# Patient Record
Sex: MALE | Race: OTHER | HISPANIC OR LATINO | URBAN - METROPOLITAN AREA
[De-identification: names, ages, dates, MRNs, and addresses within clinical notes are randomized per-mention and may not be internally consistent; named-entity substitution may affect disease eponyms.]

---

## 2020-06-26 ENCOUNTER — INPATIENT (INPATIENT)
Facility: HOSPITAL | Age: 75
LOS: 27 days | Discharge: NURSING, REHAB OR LONG-TERM CARE | End: 2020-07-24
Attending: HOSPITALIST | Admitting: HOSPITALIST
Payer: SELF-PAY

## 2020-06-26 VITALS
OXYGEN SATURATION: 100 % | SYSTOLIC BLOOD PRESSURE: 111 MMHG | TEMPERATURE: 98 F | HEART RATE: 101 BPM | RESPIRATION RATE: 18 BRPM | DIASTOLIC BLOOD PRESSURE: 77 MMHG

## 2020-06-26 DIAGNOSIS — F03.90 UNSPECIFIED DEMENTIA WITHOUT BEHAVIORAL DISTURBANCE: ICD-10-CM

## 2020-06-26 DIAGNOSIS — G93.40 ENCEPHALOPATHY, UNSPECIFIED: ICD-10-CM

## 2020-06-26 DIAGNOSIS — F19.10 OTHER PSYCHOACTIVE SUBSTANCE ABUSE, UNCOMPLICATED: ICD-10-CM

## 2020-06-26 DIAGNOSIS — R64 CACHEXIA: ICD-10-CM

## 2020-06-26 DIAGNOSIS — D64.9 ANEMIA, UNSPECIFIED: ICD-10-CM

## 2020-06-26 DIAGNOSIS — Z02.9 ENCOUNTER FOR ADMINISTRATIVE EXAMINATIONS, UNSPECIFIED: ICD-10-CM

## 2020-06-26 LAB
ALBUMIN SERPL ELPH-MCNC: 4.2 G/DL — SIGNIFICANT CHANGE UP (ref 3.3–5)
ALP SERPL-CCNC: 69 U/L — SIGNIFICANT CHANGE UP (ref 40–120)
ALT FLD-CCNC: 11 U/L — SIGNIFICANT CHANGE UP (ref 4–41)
AMPHET UR-MCNC: NEGATIVE — SIGNIFICANT CHANGE UP
ANION GAP SERPL CALC-SCNC: 13 MMO/L — SIGNIFICANT CHANGE UP (ref 7–14)
APAP SERPL-MCNC: < 15 UG/ML — LOW (ref 15–25)
APPEARANCE UR: SIGNIFICANT CHANGE UP
AST SERPL-CCNC: 27 U/L — SIGNIFICANT CHANGE UP (ref 4–40)
BACTERIA # UR AUTO: SIGNIFICANT CHANGE UP
BARBITURATES UR SCN-MCNC: NEGATIVE — SIGNIFICANT CHANGE UP
BASE EXCESS BLDV CALC-SCNC: 3.5 MMOL/L — SIGNIFICANT CHANGE UP
BASOPHILS # BLD AUTO: 0.01 K/UL — SIGNIFICANT CHANGE UP (ref 0–0.2)
BASOPHILS NFR BLD AUTO: 0.1 % — SIGNIFICANT CHANGE UP (ref 0–2)
BENZODIAZ UR-MCNC: NEGATIVE — SIGNIFICANT CHANGE UP
BILIRUB SERPL-MCNC: 0.6 MG/DL — SIGNIFICANT CHANGE UP (ref 0.2–1.2)
BILIRUB UR-MCNC: NEGATIVE — SIGNIFICANT CHANGE UP
BLOOD GAS VENOUS - CREATININE: 1.01 MG/DL — SIGNIFICANT CHANGE UP (ref 0.5–1.3)
BLOOD GAS VENOUS - FIO2: 21 — SIGNIFICANT CHANGE UP
BLOOD UR QL VISUAL: SIGNIFICANT CHANGE UP
BUN SERPL-MCNC: 28 MG/DL — HIGH (ref 7–23)
CALCIUM SERPL-MCNC: 9.6 MG/DL — SIGNIFICANT CHANGE UP (ref 8.4–10.5)
CANNABINOIDS UR-MCNC: POSITIVE — SIGNIFICANT CHANGE UP
CHLORIDE BLDV-SCNC: 102 MMOL/L — SIGNIFICANT CHANGE UP (ref 96–108)
CHLORIDE SERPL-SCNC: 100 MMOL/L — SIGNIFICANT CHANGE UP (ref 98–107)
CO2 SERPL-SCNC: 26 MMOL/L — SIGNIFICANT CHANGE UP (ref 22–31)
COCAINE METAB.OTHER UR-MCNC: NEGATIVE — SIGNIFICANT CHANGE UP
COLOR SPEC: YELLOW — SIGNIFICANT CHANGE UP
CREAT SERPL-MCNC: 1 MG/DL — SIGNIFICANT CHANGE UP (ref 0.5–1.3)
EOSINOPHIL # BLD AUTO: 0.07 K/UL — SIGNIFICANT CHANGE UP (ref 0–0.5)
EOSINOPHIL NFR BLD AUTO: 0.9 % — SIGNIFICANT CHANGE UP (ref 0–6)
ETHANOL BLD-MCNC: < 10 MG/DL — SIGNIFICANT CHANGE UP
GAS PNL BLDV: 140 MMOL/L — SIGNIFICANT CHANGE UP (ref 136–146)
GLUCOSE BLDV-MCNC: 128 MG/DL — HIGH (ref 70–99)
GLUCOSE SERPL-MCNC: 124 MG/DL — HIGH (ref 70–99)
GLUCOSE UR-MCNC: NEGATIVE — SIGNIFICANT CHANGE UP
HCO3 BLDV-SCNC: 25 MMOL/L — SIGNIFICANT CHANGE UP (ref 20–27)
HCT VFR BLD CALC: 37 % — LOW (ref 39–50)
HCT VFR BLDV CALC: 39.5 % — SIGNIFICANT CHANGE UP (ref 39–51)
HGB BLD-MCNC: 12 G/DL — LOW (ref 13–17)
HGB BLDV-MCNC: 12.8 G/DL — LOW (ref 13–17)
HYALINE CASTS # UR AUTO: SIGNIFICANT CHANGE UP
IMM GRANULOCYTES NFR BLD AUTO: 0.3 % — SIGNIFICANT CHANGE UP (ref 0–1.5)
KETONES UR-MCNC: SIGNIFICANT CHANGE UP
LACTATE BLDV-MCNC: 0.9 MMOL/L — SIGNIFICANT CHANGE UP (ref 0.5–2)
LEUKOCYTE ESTERASE UR-ACNC: SIGNIFICANT CHANGE UP
LYMPHOCYTES # BLD AUTO: 0.57 K/UL — LOW (ref 1–3.3)
LYMPHOCYTES # BLD AUTO: 7.6 % — LOW (ref 13–44)
MAGNESIUM SERPL-MCNC: 2.2 MG/DL — SIGNIFICANT CHANGE UP (ref 1.6–2.6)
MCHC RBC-ENTMCNC: 31.1 PG — SIGNIFICANT CHANGE UP (ref 27–34)
MCHC RBC-ENTMCNC: 32.4 % — SIGNIFICANT CHANGE UP (ref 32–36)
MCV RBC AUTO: 95.9 FL — SIGNIFICANT CHANGE UP (ref 80–100)
METHADONE UR-MCNC: NEGATIVE — SIGNIFICANT CHANGE UP
MONOCYTES # BLD AUTO: 0.58 K/UL — SIGNIFICANT CHANGE UP (ref 0–0.9)
MONOCYTES NFR BLD AUTO: 7.8 % — SIGNIFICANT CHANGE UP (ref 2–14)
NEUTROPHILS # BLD AUTO: 6.22 K/UL — SIGNIFICANT CHANGE UP (ref 1.8–7.4)
NEUTROPHILS NFR BLD AUTO: 83.3 % — HIGH (ref 43–77)
NITRITE UR-MCNC: NEGATIVE — SIGNIFICANT CHANGE UP
NRBC # FLD: 0 K/UL — SIGNIFICANT CHANGE UP (ref 0–0)
OPIATES UR-MCNC: NEGATIVE — SIGNIFICANT CHANGE UP
OXYCODONE UR-MCNC: NEGATIVE — SIGNIFICANT CHANGE UP
PCO2 BLDV: 55 MMHG — HIGH (ref 41–51)
PCP UR-MCNC: NEGATIVE — SIGNIFICANT CHANGE UP
PH BLDV: 7.34 PH — SIGNIFICANT CHANGE UP (ref 7.32–7.43)
PH UR: 5.5 — SIGNIFICANT CHANGE UP (ref 5–8)
PHOSPHATE SERPL-MCNC: 4 MG/DL — SIGNIFICANT CHANGE UP (ref 2.5–4.5)
PLATELET # BLD AUTO: 217 K/UL — SIGNIFICANT CHANGE UP (ref 150–400)
PMV BLD: 9 FL — SIGNIFICANT CHANGE UP (ref 7–13)
PO2 BLDV: < 24 MMHG — LOW (ref 35–40)
POTASSIUM BLDV-SCNC: 4.3 MMOL/L — SIGNIFICANT CHANGE UP (ref 3.4–4.5)
POTASSIUM SERPL-MCNC: 4.6 MMOL/L — SIGNIFICANT CHANGE UP (ref 3.5–5.3)
POTASSIUM SERPL-SCNC: 4.6 MMOL/L — SIGNIFICANT CHANGE UP (ref 3.5–5.3)
PROT SERPL-MCNC: 7.3 G/DL — SIGNIFICANT CHANGE UP (ref 6–8.3)
PROT UR-MCNC: 50 — SIGNIFICANT CHANGE UP
RBC # BLD: 3.86 M/UL — LOW (ref 4.2–5.8)
RBC # FLD: 12.9 % — SIGNIFICANT CHANGE UP (ref 10.3–14.5)
RBC CASTS # UR COMP ASSIST: HIGH (ref 0–?)
SALICYLATES SERPL-MCNC: < 5 MG/DL — LOW (ref 15–30)
SAO2 % BLDV: 26.4 % — LOW (ref 60–85)
SARS-COV-2 RNA SPEC QL NAA+PROBE: SIGNIFICANT CHANGE UP
SODIUM SERPL-SCNC: 139 MMOL/L — SIGNIFICANT CHANGE UP (ref 135–145)
SP GR SPEC: 1.02 — SIGNIFICANT CHANGE UP (ref 1–1.04)
SQUAMOUS # UR AUTO: SIGNIFICANT CHANGE UP
TSH SERPL-MCNC: 0.88 UIU/ML — SIGNIFICANT CHANGE UP (ref 0.27–4.2)
UROBILINOGEN FLD QL: SIGNIFICANT CHANGE UP
WBC # BLD: 7.47 K/UL — SIGNIFICANT CHANGE UP (ref 3.8–10.5)
WBC # FLD AUTO: 7.47 K/UL — SIGNIFICANT CHANGE UP (ref 3.8–10.5)
WBC UR QL: HIGH (ref 0–?)

## 2020-06-26 PROCEDURE — 71046 X-RAY EXAM CHEST 2 VIEWS: CPT | Mod: 26

## 2020-06-26 PROCEDURE — 70450 CT HEAD/BRAIN W/O DYE: CPT | Mod: 26

## 2020-06-26 PROCEDURE — 99223 1ST HOSP IP/OBS HIGH 75: CPT | Mod: GC

## 2020-06-26 PROCEDURE — 99285 EMERGENCY DEPT VISIT HI MDM: CPT

## 2020-06-26 RX ORDER — HEPARIN SODIUM 5000 [USP'U]/ML
5000 INJECTION INTRAVENOUS; SUBCUTANEOUS EVERY 12 HOURS
Refills: 0 | Status: DISCONTINUED | OUTPATIENT
Start: 2020-06-26 | End: 2020-07-22

## 2020-06-26 RX ORDER — LANOLIN ALCOHOL/MO/W.PET/CERES
6 CREAM (GRAM) TOPICAL AT BEDTIME
Refills: 0 | Status: DISCONTINUED | OUTPATIENT
Start: 2020-06-26 | End: 2020-07-24

## 2020-06-26 RX ADMIN — HEPARIN SODIUM 5000 UNIT(S): 5000 INJECTION INTRAVENOUS; SUBCUTANEOUS at 20:16

## 2020-06-26 NOTE — H&P ADULT - PROBLEM SELECTOR PLAN 3
Patient appears cachectic. Unable to provide history about recent weight changes. Chest xray unremarkable. CMP unremarkable.  Plan:  -Testing for HIV, Hep B, Hep C

## 2020-06-26 NOTE — H&P ADULT - ASSESSMENT
73 yo M who denies PMH, presenting after being [place] the side of the highway by EMS, found to have dementia and unable to provide a full history.

## 2020-06-26 NOTE — H&P ADULT - NSHPSOCIALHISTORY_GEN_ALL_CORE
Patient only speaks Indian.  Admits to use of marijuana and cigarettes. Denies alcohol use.  Unable to obtain additional history due to cognitive impairment.

## 2020-06-26 NOTE — H&P ADULT - ATTENDING COMMENTS
Patient seen and examined. Agree with above note by resident.    75 y/o Yoruba speaking male found walking on the highway. Patient is oriented to self only. Denies any active complaints. Unable to provide information for family. Exam only remarkable for cachexia. Labs significant for mild anemia. CTH and cxr unremarkable.     # Dementia - without behavior issues in ED. Patient is calm and redirectable. No infectious, metabolic, cardiac, neurologic etiologies apparent at this time. Suspect underlying dementia. Recommend HIV, anemia workup, b12/folate. Nutrition and PT evaluation.     Patient needs SW evaluation for safe discharge plan. No active medical concerns at this time.

## 2020-06-26 NOTE — H&P ADULT - NSHPPHYSICALEXAM_GEN_ALL_CORE
PHYSICAL EXAM:  T(C): 36.8 (20 @ 09:03), Max: 36.8 (20 @ 09:03)  HR: 101 (20 @ 09:03) (101 - 101)  BP: 111/77 (20 @ 09:03) (111/77 - 111/77)  RR: 18 (20 @ 09:03) (18 - 18)  SpO2: 100% (20 @ 09:03) (100% - 100%)  GENERAL: appears cachectic, in no apparent distress, lying in bed  HEAD:  Atraumatic, Normocephalic  EYES: no conjunctival injection, no scleral icterus  CHEST/LUNG: Clear to auscultation bilaterally; No wheeze  HEART: Regular rate and rhythm; No murmurs, rubs, or gallops  ABDOMEN: Soft, Nontender, Nondistended; Bowel sounds present  EXTREMITIES:  2+ Peripheral Pulses, No clubbing, cyanosis, or edema  PSYCH: AAOx1, unsure of baseline. only able to provide name and birthday, cannot give accurate location or year. We cannot confirm that name/ is accurate.  NEUROLOGY: non-focal  SKIN: No rashes or lesions

## 2020-06-26 NOTE — PROVIDER CONTACT NOTE (EICU) - BACKGROUND
ED Attending asked ED SW to meet with 74 year old Urdu speaking male.  He was brought to the [facility] ED because he was founding wandering by police.  MARCIA went to bedside with .  Pt.

## 2020-06-26 NOTE — ED PROVIDER NOTE - NS ED ROS FT
In additional the that documented in the HPI, the additional ROS was obtained:    CONSTITUTIONAL: No fever, no chills  EYES: no change in vision, no blurred vision  HEENT: no trouble swallowing, no trouble speaking, no sore throat  NECK: no pain, no stiffness  CV: no chest pain, no palpitations  RESP: no cough, no shortness of breath  GI: no abdominal pain, no nausea, no vomiting, no diarrhea, no constipation, no BRBPR or melena  : No dysuria, no frequency  NEURO: no headache, no new numbness, no weakness, no dizziness  SKIN: no new rashes  HEME: No easy bruising or bleeding  MSK: No joint pain, no recent trauma  Josef Rich M.D. -Resident

## 2020-06-26 NOTE — H&P ADULT - PROBLEM SELECTOR PLAN 4
Patient admitted to using marijuana and cigarettes. Denies alcohol use and has negative ethanol tox screen.  Plan:  -Monitor for acute mental status changes

## 2020-06-26 NOTE — PROVIDER CONTACT NOTE (EICU) - ASSESSMENT
said his birthday was [birth year].  When asked about next of kin he said it was his mother.  When asked her age he said 200 years old.  The name of the  is Emery Perez. Pt was pleasant and calm.  SW reported the above information to the Attending.

## 2020-06-26 NOTE — ED ADULT NURSE NOTE - CHIEF COMPLAINT QUOTE
Pt states that he was trying to get home to [place] and ran out of money, pt was found walking on the [facility], states that a bus dropped him there.  Pt offers no physical complaints

## 2020-06-26 NOTE — H&P ADULT - PROBLEM SELECTOR PLAN 1
Patient is AAOx1 and unable to give location or additional history.   UDS is only positive for cannabinoids  CT head showed no acute pathological changes  Plan:  -HIV testing  -Attempting to find additional information re: history with social work  -Monitor for any acute mental status changes

## 2020-06-26 NOTE — ED ADULT NURSE NOTE - NSIMPLEMENTINTERV_GEN_ALL_ED
Implemented All Fall Risk Interventions:  Arvilla to call system. Call bell, personal items and telephone within reach. Instruct patient to call for assistance. Room bathroom lighting operational. Non-slip footwear when patient is off stretcher. Physically safe environment: no spills, clutter or unnecessary equipment. Stretcher in lowest position, wheels locked, appropriate side rails in place. Provide visual cue, wrist band, yellow gown, etc. Monitor gait and stability. Monitor for mental status changes and reorient to person, place, and time. Review medications for side effects contributing to fall risk. Reinforce activity limits and safety measures with patient and family.

## 2020-06-26 NOTE — ED ADULT TRIAGE NOTE - CHIEF COMPLAINT QUOTE
Pt states that he was trying to get home to [place] and ran out of money, pt was found walking on the [facility], states that a bus dropped him there.  Pt offers no physical complaints

## 2020-06-26 NOTE — PROVIDER CONTACT NOTE (OTHER) - BACKGROUND
Two Police Officers from [address] [place] [address] [phone] came to [facility] ED today to meet with patient to make a missing person report. Two Police Officers from [address] [place] [address] [phone] came to [facility] ED today to meet with patient to make a missing person report.  Officer Paulo told SW that a report will be sent to the Missing
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
## 2020-06-26 NOTE — ED PROVIDER NOTE - PHYSICAL EXAMINATION
Vital signs reviewed.  CONSTITUTIONAL: NAD, awake; thin elderly male  HEAD: Normocephalic; atraumatic  EYES: EOMI, no nystagmus, no conjunctival injection, no scleral icterus  MOUTH/THROAT:  MMM  NECK: Trachea midline, no JVD  CV: Normal S1, S2; no audible murmurs; extremities WWP  RESP: pectis excavatum. normal work of breathing; CTAB, no stridor  ABD: soft, non-distended; non-tender  : Deferred  MSK/EXT: no edema, normal ROM in all four extremities, no deformities  SKIN: No gross rashes or lesions on exposed skin, no significant trophic changes  NEURO: aaox1 with unclear baseline - does not know year or location or current president, moving all extremities purposefully and spontaneously
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
## 2020-06-26 NOTE — PHARMACOTHERAPY INTERVENTION NOTE - COMMENTS
6/26/2020: Medx Incomplete [id number]  unable to obtain medication list, patient disoriented, no istop record, no insurance fill information, no pharmacy listed, no emergency contact listed

## 2020-06-26 NOTE — PROVIDER CONTACT NOTE (OTHER) - BACKGROUND
ED SW contacted the Missing Persons Line of [facility] [phone].   Krista checked the system and was unable to locate patient.  He suggested a call to [address] [place] [address] [phone].  MARCIA spoke to

## 2020-06-26 NOTE — ED ADULT NURSE NOTE - OBJECTIVE STATEMENT
73 y/o Guatemalan speaking male found on highway, brought in by EMS. States he was trying to get home.  Alert and oriented to person.

## 2020-06-26 NOTE — H&P ADULT - NSHPLABSRESULTS_GEN_ALL_CORE
12.0   7.47  )-----------( 217      ( 2020 10:15 )             37.0       06-    139  |  100  |  28<H>  ----------------------------<  124<H>  4.6   |  26  |  1.00    Ca    9.6      2020 10:15  Phos  4.0       Mg     2.2         TPro  7.3  /  Alb  4.2  /  TBili  0.6  /  DBili  x   /  AST  27  /  ALT  11  /  AlkPhos  69                Urinalysis Basic - ( 2020 10:34 )    Color: YELLOW / Appearance: HAZY / S.025 / pH: 5.5  Gluc: NEGATIVE / Ketone: TRACE  / Bili: NEGATIVE / Urobili: TRACE   Blood: TRACE / Protein: 50 / Nitrite: NEGATIVE   Leuk Esterase: SMALL / RBC: 11-25 / WBC 11-25   Sq Epi: OCC / Non Sq Epi: x / Bacteria: FEW                CAPILLARY BLOOD GLUCOSE

## 2020-06-26 NOTE — ED ADULT NURSE REASSESSMENT NOTE - NS ED NURSE REASSESS COMMENT FT1
Patient brought in by EMS stating he has been trying to get a ride to [place].  Patient alert and oriented to self.  Unsure where he is or what day/year it is. 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  Patient has no other complaints. No injuries noted.  at bedside.  Blood drawn and sent to lab. Patient resting comfortably.
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
## 2020-06-26 NOTE — H&P ADULT - NSHPREVIEWOFSYSTEMS_GEN_ALL_CORE
REVIEW OF SYSTEMS:  EYES/ENT: No visual changes;  No vertigo or throat pain   NECK: No pain or stiffness  RESPIRATORY: No cough, wheezing, hemoptysis; No shortness of breath  CARDIOVASCULAR: No chest pain or palpitations  GASTROINTESTINAL: No abdominal or epigastric pain. No nausea, vomiting, or hematemesis; No diarrhea or constipation. No melena or hematochezia.  GENITOURINARY: No dysuria, frequency or hematuria  NEUROLOGICAL: No numbness or weakness  PSYCH: only able to provide name and birthday, cannot give accurate location or year. Cannot confirm that name/ is accurate.  SKIN: No itching, rashes

## 2020-06-26 NOTE — PROVIDER CONTACT NOTE (OTHER) - ASSESSMENT
Officer Rita at [employer] who also checked their system, however there was no information on patient.  Officer Vosue will send two Officers to ED today to speak with pt. and do a full report.  SW alerted ED Attending spectra [phone] and Hospitalist of the above information.  Pt has been admitted. Officer Rita at [employer] who also checked their system, however there was no information on patient.  Officer Vosue will send two Officers, one will be Hebrew speaking to ED today to speak with pt. and do a full report.  SW alerted ED Attending spectra [id number] and Hospitalist of the above information.  Pt has been admitted.

## 2020-06-27 DIAGNOSIS — B85.2 PEDICULOSIS, UNSPECIFIED: ICD-10-CM

## 2020-06-27 LAB
ANION GAP SERPL CALC-SCNC: 12 MMO/L — SIGNIFICANT CHANGE UP (ref 7–14)
BUN SERPL-MCNC: 21 MG/DL — SIGNIFICANT CHANGE UP (ref 7–23)
CALCIUM SERPL-MCNC: 9.4 MG/DL — SIGNIFICANT CHANGE UP (ref 8.4–10.5)
CHLORIDE SERPL-SCNC: 105 MMOL/L — SIGNIFICANT CHANGE UP (ref 98–107)
CO2 SERPL-SCNC: 25 MMOL/L — SIGNIFICANT CHANGE UP (ref 22–31)
CREAT SERPL-MCNC: 0.68 MG/DL — SIGNIFICANT CHANGE UP (ref 0.5–1.3)
FOLATE SERPL-MCNC: 13.9 NG/ML — SIGNIFICANT CHANGE UP (ref 4.7–20)
GLUCOSE SERPL-MCNC: 85 MG/DL — SIGNIFICANT CHANGE UP (ref 70–99)
HBV SURFACE AB SER-ACNC: REACTIVE — HIGH
HBV SURFACE AG SER-ACNC: NEGATIVE — SIGNIFICANT CHANGE UP
HCT VFR BLD CALC: 35.3 % — LOW (ref 39–50)
HCV AB S/CO SERPL IA: 0.1 S/CO — SIGNIFICANT CHANGE UP (ref 0–0.99)
HCV AB SERPL-IMP: SIGNIFICANT CHANGE UP
HGB BLD-MCNC: 11.8 G/DL — LOW (ref 13–17)
MAGNESIUM SERPL-MCNC: 1.9 MG/DL — SIGNIFICANT CHANGE UP (ref 1.6–2.6)
MCHC RBC-ENTMCNC: 32 PG — SIGNIFICANT CHANGE UP (ref 27–34)
MCHC RBC-ENTMCNC: 33.4 % — SIGNIFICANT CHANGE UP (ref 32–36)
MCV RBC AUTO: 95.7 FL — SIGNIFICANT CHANGE UP (ref 80–100)
NRBC # FLD: 0 K/UL — SIGNIFICANT CHANGE UP (ref 0–0)
PHOSPHATE SERPL-MCNC: 2.6 MG/DL — SIGNIFICANT CHANGE UP (ref 2.5–4.5)
PLATELET # BLD AUTO: 198 K/UL — SIGNIFICANT CHANGE UP (ref 150–400)
PMV BLD: 9.2 FL — SIGNIFICANT CHANGE UP (ref 7–13)
POTASSIUM SERPL-MCNC: 4.2 MMOL/L — SIGNIFICANT CHANGE UP (ref 3.5–5.3)
POTASSIUM SERPL-SCNC: 4.2 MMOL/L — SIGNIFICANT CHANGE UP (ref 3.5–5.3)
RBC # BLD: 3.69 M/UL — LOW (ref 4.2–5.8)
RBC # FLD: 12.9 % — SIGNIFICANT CHANGE UP (ref 10.3–14.5)
SODIUM SERPL-SCNC: 142 MMOL/L — SIGNIFICANT CHANGE UP (ref 135–145)
VIT B12 SERPL-MCNC: 1271 PG/ML — HIGH (ref 200–900)
WBC # BLD: 5.94 K/UL — SIGNIFICANT CHANGE UP (ref 3.8–10.5)
WBC # FLD AUTO: 5.94 K/UL — SIGNIFICANT CHANGE UP (ref 3.8–10.5)

## 2020-06-27 PROCEDURE — 99232 SBSQ HOSP IP/OBS MODERATE 35: CPT | Mod: GC

## 2020-06-27 RX ORDER — PERMETHRIN CREAM 5% W/W 50 MG/G
1 CREAM TOPICAL ONCE
Refills: 0 | Status: COMPLETED | OUTPATIENT
Start: 2020-06-27 | End: 2020-06-27

## 2020-06-27 RX ORDER — PERMETHRIN CREAM 5% W/W 50 MG/G
1 CREAM TOPICAL ONCE
Refills: 0 | Status: DISCONTINUED | OUTPATIENT
Start: 2020-06-27 | End: 2020-06-27

## 2020-06-27 RX ADMIN — PERMETHRIN CREAM 5% W/W 1 APPLICATION(S): 50 CREAM TOPICAL at 16:07

## 2020-06-27 RX ADMIN — HEPARIN SODIUM 5000 UNIT(S): 5000 INJECTION INTRAVENOUS; SUBCUTANEOUS at 05:47

## 2020-06-27 NOTE — PROGRESS NOTE ADULT - PROBLEM SELECTOR PLAN 3
Patient appears cachectic. Unable to provide history about recent weight changes. Chest xray unremarkable. CMP unremarkable.  Plan:  -Testing for HIV, Hep B, Hep C Patient appears cachectic. Unable to provide history about recent weight changes. Chest xray unremarkable. CMP unremarkable.  Plan:  -Hep B negative  -Awaiting Hep C and HIV reusults

## 2020-06-27 NOTE — PHYSICAL THERAPY INITIAL EVALUATION ADULT - PERTINENT HX OF CURRENT PROBLEM, REHAB EVAL
This is a 75 yo M presenting after [place] the side of the [place] by EMS, found to have dementia and unable to provide a full history.

## 2020-06-27 NOTE — PROGRESS NOTE ADULT - PROBLEM SELECTOR PLAN 1
Patient is AAOx1 and unable to give location or additional history.   UDS is only positive for cannabinoids  CT head showed no acute pathological changes  Plan:  -HIV testing  -Attempting to find additional information re: history with social work  -Monitor for any acute mental status changes Patient is AAOx1 and unable to give location or additional history.   UDS is only positive for cannabinoids  CT head showed no acute pathological changes  Plan:  -Awaiting HIV testing results  -Attempting to find additional information re: history with social work. Police report filed.  -Monitor for any acute mental status changes
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
## 2020-06-27 NOTE — DIETITIAN INITIAL EVALUATION ADULT. - PERTINENT LABORATORY DATA
06-27 Na 142 mmol/L Glu 85 mg/dL K+ 4.2 mmol/L Cr 0.68 mg/dL BUN 21 mg/dL Phos 2.6 mg/dL Alb n/a   PAB n/a   Hgb 11.8 g/dL<L> Hct 35.3 %<L> HgA1C n/a    Glucose, Serum: 85 mg/dL

## 2020-06-27 NOTE — PHYSICAL THERAPY INITIAL EVALUATION ADULT - GENERAL OBSERVATIONS, REHAB EVAL
Patient received semi supine in bed , (+) bed alarm ,pt in no apparent distress. Pt is Greenlandic speaking but is able to follow vc's.

## 2020-06-27 NOTE — PROGRESS NOTE ADULT - PROBLEM SELECTOR PLAN 4
Patient admitted to using marijuana and cigarettes. Denies alcohol use and has negative ethanol tox screen.  Plan:  -Monitor for acute mental status changes Nurse believes may have lice. Awaiting call back from derm to parkeraulate

## 2020-06-27 NOTE — PROGRESS NOTE ADULT - SUBJECTIVE AND OBJECTIVE BOX
PROGRESS NOTE:     Patient is a 74y old  Male who presents with a chief complaint of Dementia, unspecified type (2020 06:48)      SUBJECTIVE / OVERNIGHT EVENTS: NAEO. Patient still unable to provide additional info other than his name- Prakash Ramirez. Nurse believes patient may have lice. Denies chest pain ,abd pain, n/v,           MEDICATIONS  (STANDING):  heparin   Injectable 5000 Unit(s) SubCutaneous every 12 hours  melatonin 6 milliGRAM(s) Oral at bedtime    MEDICATIONS  (PRN):      CAPILLARY BLOOD GLUCOSE        I&O's Summary      PHYSICAL EXAM:  Vital Signs Last 24 Hrs  T(C): 36.7 (2020 05:46), Max: 37 (2020 16:03)  T(F): 98 (2020 05:46), Max: 98.6 (2020 16:03)  HR: 69 (2020 05:46) (68 - 85)  BP: 115/75 (2020 05:46) (115/75 - 122/62)  BP(mean): --  RR: 16 (2020 05:46) (15 - 16)  SpO2: 97% (2020 05:46) (95% - 99%)    	GENERAL: appears cachectic, in no apparent distress, lying in bed  	HEAD:  Atraumatic, Normocephalic  	EYES: no conjunctival injection, no scleral icterus  	CHEST/LUNG: Clear to auscultation bilaterally; No wheeze  	HEART: Regular rate and rhythm; No murmurs, rubs, or gallops  	ABDOMEN: Soft, Nontender, Nondistended; Bowel sounds present  	EXTREMITIES:  2+ Peripheral Pulses, No clubbing, cyanosis, or edema  	PSYCH: AAOx1, unsure of baseline. only able to provide name and birthday, cannot give accurate location or year. We cannot confirm that name/ is accurate.  	NEUROLOGY: non-focal  SKIN: No rashes or lesions  LABS:                        11.8   5.94  )-----------( 198      ( 2020 05:49 )             35.3     06-27    142  |  105  |  21  ----------------------------<  85  4.2   |  25  |  0.68    Ca    9.4      2020 05:49  Phos  2.6     06-27  Mg     1.9         TPro  7.3  /  Alb  4.2  /  TBili  0.6  /  DBili  x   /  AST  27  /  ALT  11  /  AlkPhos  69            Urinalysis Basic - ( 2020 10:34 )    Color: YELLOW / Appearance: HAZY / S.025 / pH: 5.5  Gluc: NEGATIVE / Ketone: TRACE  / Bili: NEGATIVE / Urobili: TRACE   Blood: TRACE / Protein: 50 / Nitrite: NEGATIVE   Leuk Esterase: SMALL / RBC: 11-25 / WBC 11-25   Sq Epi: OCC / Non Sq Epi: x / Bacteria: FEW          RADIOLOGY & ADDITIONAL TESTS:  Results Reviewed:   Imaging Personally Reviewed:  Electrocardiogram Personally Reviewed:    COORDINATION OF CARE:  Care Discussed with Consultants/Other Providers [Y/N]:  Prior or Outpatient Records Reviewed [Y/N]:

## 2020-06-27 NOTE — PROGRESS NOTE ADULT - PROBLEM SELECTOR PLAN 5
Dispo: Social work working to find additional information on patient, family, and medical history Patient admitted to using marijuana and cigarettes. Denies alcohol use and has negative ethanol tox screen.  Plan:  -Monitor for acute mental status changes

## 2020-06-27 NOTE — PROGRESS NOTE ADULT - PROBLEM SELECTOR PLAN 2
Mild normocytic anemia (Hgb 12)  Plan:  -Testing B12, Folate Mild normocytic anemia (Hgb 12)  Plan:  -B12 and Folate normal

## 2020-06-27 NOTE — DIETITIAN INITIAL EVALUATION ADULT. - PHYSICAL APPEARANCE
underweight/other (specify)/emaciated nutrition focused physical exam: patient with severe subcutaneous weight loss of triceps, orbital fat pads, buccal region and severe muscle depletion of temporal, shoulder, quadriceps and calf muscles as well as clavicle region.   Skin: No pressure ulcers/DTI noted in flowsheets.  Edema: none noted

## 2020-06-28 LAB
HIV COMBO RESULT: SIGNIFICANT CHANGE UP
HIV1+2 AB SPEC QL: SIGNIFICANT CHANGE UP

## 2020-06-28 PROCEDURE — 99232 SBSQ HOSP IP/OBS MODERATE 35: CPT | Mod: GC

## 2020-06-28 RX ORDER — SODIUM CHLORIDE 9 MG/ML
500 INJECTION, SOLUTION INTRAVENOUS
Refills: 0 | Status: DISCONTINUED | OUTPATIENT
Start: 2020-06-28 | End: 2020-06-29

## 2020-06-28 RX ADMIN — HEPARIN SODIUM 5000 UNIT(S): 5000 INJECTION INTRAVENOUS; SUBCUTANEOUS at 05:26

## 2020-06-28 RX ADMIN — SODIUM CHLORIDE 500 MILLILITER(S): 9 INJECTION, SOLUTION INTRAVENOUS at 23:32

## 2020-06-28 NOTE — PROGRESS NOTE ADULT - PROBLEM SELECTOR PLAN 4
Nurse believes may have lice. Awaiting call back from derm to parkeraulate Will evaluate specimen when placed at bedside  -S/p permetherin  -Derm consult if still finding insects

## 2020-06-28 NOTE — PROGRESS NOTE ADULT - PROBLEM SELECTOR PLAN 1
Patient is AAOx1 and unable to give location or additional history.   UDS is only positive for cannabinoids  CT head showed no acute pathological changes  Plan:  -Awaiting HIV testing results  -Attempting to find additional information re: history with social work. Police report filed.  -Monitor for any acute mental status changes Patient is AAOx1 and unable to give location or additional history.   UDS is only positive for cannabinoids  CT head showed no acute pathological changes  Plan:  -HIV negative  -Attempting to find additional information re: history with social work. Police report filed  -Monitor for any acute mental status changes

## 2020-06-28 NOTE — PROGRESS NOTE ADULT - SUBJECTIVE AND OBJECTIVE BOX
INCOMPLETE NOTE PROGRESS NOTE:     Patient is a 74y old  Male who presents with a chief complaint of Dementia, unspecified type (28 Jun 2020 07:23)      SUBJECTIVE / OVERNIGHT EVENTS:  No acute events  Feels well. No acute complaints. No itching. No pain.    ADDITIONAL REVIEW OF SYSTEMS:  No fevers, chest pain, shortness of breath, abdominal pain, lower extremity swelling or pain, dysuria, or constipation.    MEDICATIONS  (STANDING):  heparin   Injectable 5000 Unit(s) SubCutaneous every 12 hours  melatonin 6 milliGRAM(s) Oral at bedtime    MEDICATIONS  (PRN):      CAPILLARY BLOOD GLUCOSE        I&O's Summary      PHYSICAL EXAM:  Vital Signs Last 24 Hrs  T(C): 36.7 (28 Jun 2020 13:05), Max: 36.9 (27 Jun 2020 22:20)  T(F): 98.1 (28 Jun 2020 13:05), Max: 98.5 (27 Jun 2020 22:20)  HR: 64 (28 Jun 2020 13:05) (59 - 64)  BP: 121/64 (28 Jun 2020 13:05) (101/60 - 121/64)  BP(mean): --  RR: 17 (28 Jun 2020 13:05) (16 - 17)  SpO2: 99% (28 Jun 2020 13:05) (96% - 99%)    CONSTITUTIONAL: NAD, well-developed  CARDIOVASCULAR: Regular rate and rhythm. Normal S1 and S2. No murmurs.  RESPIRATORY: Normal respiratory effort, Lungs CTAB  EXTREMITIES: No JOAQUIN, peripheral pulses intact.  ABDOMEN: Nontender to palpation, normoactive bowel sounds, no rebound/guarding; No hepatosplenomegaly  MUSCLOSKELETAL: no clubbing or cyanosis of digits; no joint swelling or tenderness to palpation  PSYCH: A+O to person, place, and time; affect appropriate  Could not find any insects to examine. Will f/u with nursing and ask to place specimen at bedside.    LABS:                        11.8   5.94  )-----------( 198      ( 27 Jun 2020 05:49 )             35.3     06-27    142  |  105  |  21  ----------------------------<  85  4.2   |  25  |  0.68    Ca    9.4      27 Jun 2020 05:49  Phos  2.6     06-27  Mg     1.9     06-27                  RADIOLOGY & ADDITIONAL TESTS:  Results Reviewed: No labs  Imaging Personally Reviewed:  Electrocardiogram Personally Reviewed:    COORDINATION OF CARE:  Care Discussed with Consultants/Other Providers [Y/N]:  Prior or Outpatient Records Reviewed [Y/N]:

## 2020-06-28 NOTE — PROGRESS NOTE ADULT - PROBLEM SELECTOR PLAN 3
Patient appears cachectic. Unable to provide history about recent weight changes. Chest xray unremarkable. CMP unremarkable.  Plan:  -Hep B negative  -Awaiting Hep C and HIV reusults

## 2020-06-28 NOTE — PROGRESS NOTE ADULT - ASSESSMENT
75 yo M who denies PMH, presenting [place] side of the [place], found to have dementia and unable to provide a full history.

## 2020-06-29 PROCEDURE — 99232 SBSQ HOSP IP/OBS MODERATE 35: CPT | Mod: GC

## 2020-06-29 RX ORDER — PERMETHRIN CREAM 5% W/W 50 MG/G
1 CREAM TOPICAL ONCE
Refills: 0 | Status: COMPLETED | OUTPATIENT
Start: 2020-06-29 | End: 2020-06-29

## 2020-06-29 RX ADMIN — Medication 6 MILLIGRAM(S): at 22:18

## 2020-06-29 RX ADMIN — PERMETHRIN CREAM 5% W/W 1 APPLICATION(S): 50 CREAM TOPICAL at 13:56

## 2020-06-29 RX ADMIN — HEPARIN SODIUM 5000 UNIT(S): 5000 INJECTION INTRAVENOUS; SUBCUTANEOUS at 22:18

## 2020-06-29 NOTE — PROGRESS NOTE ADULT - SUBJECTIVE AND OBJECTIVE BOX
PROGRESS NOTE:     Patient is a 74y old  Male who presents with a chief complaint of Dementia, unspecified type (2020 07:23)      SUBJECTIVE / OVERNIGHT EVENTS:    REVIEW OF SYSTEMS:    CONSTITUTIONAL: No weakness, fevers or chills  EYES/ENT: No visual changes;  No vertigo or throat pain   NECK: No pain or stiffness  RESPIRATORY: No cough, wheezing, hemoptysis; No shortness of breath  CARDIOVASCULAR: No chest pain or palpitations  GASTROINTESTINAL: No abdominal or epigastric pain. No nausea, vomiting, or hematemesis; No diarrhea or constipation. No melena or hematochezia.  GENITOURINARY: No dysuria, frequency or hematuria  NEUROLOGICAL: No numbness or weakness  SKIN: No itching, rashes      MEDICATIONS  (STANDING):  heparin   Injectable 5000 Unit(s) SubCutaneous every 12 hours  lactated ringers. 500 milliLiter(s) (500 mL/Hr) IV Continuous <Continuous>  melatonin 6 milliGRAM(s) Oral at bedtime  permethrin 5% Cream 1 Application(s) Topical once    MEDICATIONS  (PRN):      CAPILLARY BLOOD GLUCOSE        I&O's Summary    2020 07:01  -  2020 07:00  --------------------------------------------------------  IN: 500 mL / OUT: 1400 mL / NET: -900 mL        PHYSICAL EXAM:  Vital Signs Last 24 Hrs  T(C): 36.4 (2020 04:07), Max: 37 (2020 21:38)  T(F): 97.6 (2020 04:07), Max: 98.6 (2020 21:38)  HR: 56 (2020 04:07) (56 - 64)  BP: 114/57 (2020 04:07) (101/51 - 121/64)  BP(mean): --  RR: 17 (2020 04:07) (16 - 17)  SpO2: 95% (2020 04:07) (95% - 99%)    GENERAL: appears cachectic, in no apparent distress, lying in bed  	HEAD:  Atraumatic, Normocephalic  	EYES: no conjunctival injection, no scleral icterus  	CHEST/LUNG: Clear to auscultation bilaterally; No wheeze  	HEART: Regular rate and rhythm; No murmurs, rubs, or gallops  	ABDOMEN: Soft, Nontender, Nondistended; Bowel sounds present  	EXTREMITIES:  2+ Peripheral Pulses, No clubbing, cyanosis, or edema  	PSYCH: AAOx1, unsure of baseline. only able to provide name and birthday, cannot give accurate location or year. We cannot confirm that name/ is accurate.  	NEUROLOGY: non-focal  SKIN: No rashes or lesions    LABS:                      RADIOLOGY & ADDITIONAL TESTS:  Results Reviewed:   Imaging Personally Reviewed:  Electrocardiogram Personally Reviewed:    COORDINATION OF CARE:  Care Discussed with Consultants/Other Providers [Y/N]:  Prior or Outpatient Records Reviewed [Y/N]: PROGRESS NOTE:     Patient is a 74y old  Male who presents with a chief complaint of Dementia, unspecified type (2020 07:23)      SUBJECTIVE / OVERNIGHT EVENTS:  NAEO. Patient feels well and has no complaints.       MEDICATIONS  (STANDING):  heparin   Injectable 5000 Unit(s) SubCutaneous every 12 hours  lactated ringers. 500 milliLiter(s) (500 mL/Hr) IV Continuous <Continuous>  melatonin 6 milliGRAM(s) Oral at bedtime  permethrin 5% Cream 1 Application(s) Topical once    MEDICATIONS  (PRN):      CAPILLARY BLOOD GLUCOSE        I&O's Summary    2020 07:01  -  2020 07:00  --------------------------------------------------------  IN: 500 mL / OUT: 1400 mL / NET: -900 mL        PHYSICAL EXAM:  Vital Signs Last 24 Hrs  T(C): 36.4 (2020 04:07), Max: 37 (2020 21:38)  T(F): 97.6 (2020 04:07), Max: 98.6 (2020 21:38)  HR: 56 (2020 04:07) (56 - 64)  BP: 114/57 (2020 04:07) (101/51 - 121/64)  BP(mean): --  RR: 17 (2020 04:07) (16 - 17)  SpO2: 95% (2020 04:07) (95% - 99%)    GENERAL: appears cachectic, in no apparent distress, lying in bed  	HEAD:  Atraumatic, Normocephalic  	EYES: no conjunctival injection, no scleral icterus  	CHEST/LUNG: Clear to auscultation bilaterally; No wheeze  	HEART: Regular rate and rhythm; No murmurs, rubs, or gallops  	ABDOMEN: Soft, Nontender, Nondistended; Bowel sounds present  	EXTREMITIES:  2+ Peripheral Pulses, No clubbing, cyanosis, or edema  	PSYCH: AAOx1, unsure of baseline. only able to provide name and birthday, cannot give accurate location or year. We cannot confirm that name/ is accurate.  	NEUROLOGY: non-focal  SKIN: No rashes or lesions    LABS:                      RADIOLOGY & ADDITIONAL TESTS:  Results Reviewed:   Imaging Personally Reviewed:  Electrocardiogram Personally Reviewed:    COORDINATION OF CARE:  Care Discussed with Consultants/Other Providers [Y/N]:  Prior or Outpatient Records Reviewed [Y/N]:

## 2020-06-29 NOTE — PROGRESS NOTE ADULT - PROBLEM SELECTOR PLAN 2
Mild normocytic anemia (Hgb 12)  Plan:  -B12 and Folate normal Dispo: Social work working to find additional information on patient, family, and medical history

## 2020-06-29 NOTE — PROGRESS NOTE ADULT - PROBLEM SELECTOR PLAN 4
Will evaluate specimen when placed at bedside  -S/p permetherin  -Derm consult if still finding insects Mild normocytic anemia (Hgb 12)  Plan:  -B12 and Folate normal

## 2020-06-29 NOTE — PROGRESS NOTE ADULT - PROBLEM SELECTOR PLAN 1
Patient is AAOx1 and unable to give location or additional history.   UDS is only positive for cannabinoids  CT head showed no acute pathological changes  Plan:  -HIV negative  -Attempting to find additional information re: history with social work. Police report filed  -Monitor for any acute mental status changes CT head showed no acute pathological changes. UDS is only positive for cannabinoids  Plan:  -HIV negative  -Attempting to find additional information re: history with social work. Police report filed  -Monitor for any acute mental status changes

## 2020-06-29 NOTE — PROGRESS NOTE ADULT - PROBLEM SELECTOR PLAN 3
Patient appears cachectic. Unable to provide history about recent weight changes. Chest xray unremarkable. CMP unremarkable.  Plan:  -Hep B negative  -Awaiting Hep C and HIV reusults Will evaluate specimen when placed at bedside  -S/p permetherin  -Derm consult if still finding insects

## 2020-06-29 NOTE — PROGRESS NOTE ADULT - PROBLEM SELECTOR PLAN 5
Patient admitted to using marijuana and cigarettes. Denies alcohol use and has negative ethanol tox screen.  Plan:  -Monitor for acute mental status changes Patient appears cachectic. Unable to provide history about recent weight changes. Chest xray unremarkable. CMP unremarkable.  Plan:  -Hep B negative  -Hep C negative  -HIV negative

## 2020-06-30 PROCEDURE — 99232 SBSQ HOSP IP/OBS MODERATE 35: CPT | Mod: GC

## 2020-06-30 RX ADMIN — Medication 6 MILLIGRAM(S): at 21:08

## 2020-06-30 RX ADMIN — HEPARIN SODIUM 5000 UNIT(S): 5000 INJECTION INTRAVENOUS; SUBCUTANEOUS at 17:38

## 2020-06-30 NOTE — PROGRESS NOTE ADULT - PROBLEM SELECTOR PLAN 5
Patient appears cachectic. Unable to provide history about recent weight changes. Chest xray unremarkable. CMP unremarkable.  Plan:  -Hep B negative  -Hep C negative  -HIV negative

## 2020-06-30 NOTE — PROGRESS NOTE ADULT - SUBJECTIVE AND OBJECTIVE BOX
PROGRESS NOTE:     Patient is a 74y old  Male who presents with a chief complaint of Dementia, unspecified type (2020 07:38)      SUBJECTIVE / OVERNIGHT EVENTS:    REVIEW OF SYSTEMS:    CONSTITUTIONAL: No weakness, fevers or chills  EYES/ENT: No visual changes;  No vertigo or throat pain   NECK: No pain or stiffness  RESPIRATORY: No cough, wheezing, hemoptysis; No shortness of breath  CARDIOVASCULAR: No chest pain or palpitations  GASTROINTESTINAL: No abdominal or epigastric pain. No nausea, vomiting, or hematemesis; No diarrhea or constipation. No melena or hematochezia.  GENITOURINARY: No dysuria, frequency or hematuria  NEUROLOGICAL: No numbness or weakness  SKIN: No itching, rashes      MEDICATIONS  (STANDING):  heparin   Injectable 5000 Unit(s) SubCutaneous every 12 hours  melatonin 6 milliGRAM(s) Oral at bedtime    MEDICATIONS  (PRN):      CAPILLARY BLOOD GLUCOSE        I&O's Summary    2020 07:01  -  2020 07:00  --------------------------------------------------------  IN: 500 mL / OUT: 1400 mL / NET: -900 mL        PHYSICAL EXAM:  Vital Signs Last 24 Hrs  T(C): 36.9 (2020 05:41), Max: 37.6 (2020 22:06)  T(F): 98.5 (2020 05:41), Max: 99.6 (2020 22:06)  HR: 59 (2020 05:41) (59 - 66)  BP: 110/57 (2020 05:41) (108/58 - 110/57)  BP(mean): --  RR: 16 (2020 05:41) (16 - 17)  SpO2: 97% (2020 05:41) (96% - 98%)      GENERAL: appears cachectic, in no apparent distress, lying in bed  	HEAD:  Atraumatic, Normocephalic  	EYES: no conjunctival injection, no scleral icterus  	CHEST/LUNG: Clear to auscultation bilaterally; No wheeze  	HEART: Regular rate and rhythm; No murmurs, rubs, or gallops  	ABDOMEN: Soft, Nontender, Nondistended; Bowel sounds present  	EXTREMITIES:  2+ Peripheral Pulses, No clubbing, cyanosis, or edema  	PSYCH: AAOx1, unsure of baseline. only able to provide name and birthday, cannot give accurate location or year. We cannot confirm that name/ is accurate.  	NEUROLOGY: non-focal  SKIN: No rashes or lesions      LABS:                      RADIOLOGY & ADDITIONAL TESTS:  Results Reviewed:   Imaging Personally Reviewed:  Electrocardiogram Personally Reviewed:    COORDINATION OF CARE:  Care Discussed with Consultants/Other Providers [Y/N]:  Prior or Outpatient Records Reviewed [Y/N]: PROGRESS NOTE:     Patient is a 74y old  Male who presents with a chief complaint of Dementia, unspecified type (2020 07:38)      SUBJECTIVE / OVERNIGHT EVENTS: NAEO. Patient remains comfortable with no complaints. Permethrin for lice was ordered for the patient.    MEDICATIONS  (STANDING):  heparin   Injectable 5000 Unit(s) SubCutaneous every 12 hours  melatonin 6 milliGRAM(s) Oral at bedtime    MEDICATIONS  (PRN):      CAPILLARY BLOOD GLUCOSE        I&O's Summary    2020 07:01  -  2020 07:00  --------------------------------------------------------  IN: 500 mL / OUT: 1400 mL / NET: -900 mL        PHYSICAL EXAM:  Vital Signs Last 24 Hrs  T(C): 36.9 (2020 05:41), Max: 37.6 (2020 22:06)  T(F): 98.5 (2020 05:41), Max: 99.6 (2020 22:06)  HR: 59 (2020 05:41) (59 - 66)  BP: 110/57 (2020 05:41) (108/58 - 110/57)  BP(mean): --  RR: 16 (2020 05:41) (16 - 17)  SpO2: 97% (2020 05:41) (96% - 98%)      GENERAL: appears cachectic, in no apparent distress, lying in bed  	HEAD:  Atraumatic, Normocephalic  	EYES: no conjunctival injection, no scleral icterus  	CHEST/LUNG: Clear to auscultation bilaterally; No wheeze  	HEART: Regular rate and rhythm; No murmurs, rubs, or gallops  	ABDOMEN: Soft, Nontender, Nondistended; Bowel sounds present  	EXTREMITIES:  2+ Peripheral Pulses, No clubbing, cyanosis, or edema  	PSYCH: AAOx1, unsure of baseline. only able to provide name and birthday, cannot give accurate location or year. We cannot confirm that name/ is accurate.  	NEUROLOGY: non-focal  SKIN: No rashes or lesions      LABS:  No new labs                    RADIOLOGY & ADDITIONAL TESTS:  Results Reviewed:   Imaging Personally Reviewed:  Electrocardiogram Personally Reviewed:    COORDINATION OF CARE:  Care Discussed with Consultants/Other Providers [Y/N]:  Prior or Outpatient Records Reviewed [Y/N]:

## 2020-06-30 NOTE — PROVIDER CONTACT NOTE (OTHER) - ASSESSMENT
Patient is alert and calm and resting comfortably in bed. DBP was 52, but all other VS stable. No acute distress noted.

## 2020-06-30 NOTE — PROGRESS NOTE ADULT - PROBLEM SELECTOR PLAN 3
Will evaluate specimen when placed at bedside  -S/p permetherin  -Derm consult if still finding insects Will evaluate specimen when placed at bedside  -S/p permethrin  -Derm consult if still finding insects

## 2020-06-30 NOTE — PROGRESS NOTE ADULT - PROBLEM SELECTOR PLAN 1
CT head showed no acute pathological changes. UDS is only positive for cannabinoids  Plan:  -HIV negative  -Attempting to find additional information re: history with social work. Police report filed  -Monitor for any acute mental status changes

## 2020-07-01 PROCEDURE — 99232 SBSQ HOSP IP/OBS MODERATE 35: CPT | Mod: GC

## 2020-07-01 RX ADMIN — Medication 6 MILLIGRAM(S): at 22:09

## 2020-07-01 RX ADMIN — HEPARIN SODIUM 5000 UNIT(S): 5000 INJECTION INTRAVENOUS; SUBCUTANEOUS at 05:55

## 2020-07-01 RX ADMIN — HEPARIN SODIUM 5000 UNIT(S): 5000 INJECTION INTRAVENOUS; SUBCUTANEOUS at 17:13

## 2020-07-01 NOTE — PROGRESS NOTE ADULT - PROBLEM SELECTOR PLAN 3
Will evaluate specimen when placed at bedside  -S/p permethrin  -Derm consult if still finding insects

## 2020-07-01 NOTE — PROGRESS NOTE ADULT - SUBJECTIVE AND OBJECTIVE BOX
PROGRESS NOTE:     Patient is a 74y old  Male who presents with a chief complaint of Dementia, unspecified type (2020 06:54)      SUBJECTIVE / OVERNIGHT EVENTS: NAEO. Feels well, no complaints. Eating and drinking well    REVIEW OF SYSTEMS:    CONSTITUTIONAL: No weakness, fevers or chills  EYES/ENT: No visual changes;  No vertigo or throat pain   NECK: No pain or stiffness  RESPIRATORY: No cough, wheezing, hemoptysis; No shortness of breath  CARDIOVASCULAR: No chest pain or palpitations  GASTROINTESTINAL: No abdominal or epigastric pain. No nausea, vomiting, or hematemesis; No diarrhea or constipation. No melena or hematochezia.  GENITOURINARY: No dysuria, frequency or hematuria  NEUROLOGICAL: No numbness or weakness  SKIN: No itching, rashes      MEDICATIONS  (STANDING):  heparin   Injectable 5000 Unit(s) SubCutaneous every 12 hours  melatonin 6 milliGRAM(s) Oral at bedtime    MEDICATIONS  (PRN):      CAPILLARY BLOOD GLUCOSE        I&O's Summary    2020 07:01  -  2020 07:00  --------------------------------------------------------  IN: 0 mL / OUT: 850 mL / NET: -850 mL        PHYSICAL EXAM:  Vital Signs Last 24 Hrs  T(C): 36.8 (2020 05:54), Max: 37.1 (2020 21:07)  T(F): 98.3 (2020 05:54), Max: 98.7 (2020 21:07)  HR: 54 (2020 05:54) (54 - 67)  BP: 138/114 (2020 05:54) (115/53 - 138/114)  BP(mean): --  RR: 18 (2020 05:54) (16 - 18)  SpO2: 98% (2020 05:54) (97% - 100%)      GENERAL: appears cachectic, in no apparent distress, lying in bed  	HEAD:  Atraumatic, Normocephalic  	EYES: no conjunctival injection, no scleral icterus  	CHEST/LUNG: Clear to auscultation bilaterally; No wheeze  	HEART: Regular rate and rhythm; No murmurs, rubs, or gallops  	ABDOMEN: Soft, Nontender, Nondistended; Bowel sounds present  	EXTREMITIES:  2+ Peripheral Pulses, No clubbing, cyanosis, or edema  	PSYCH: unsure of baseline. only able to provide name and birthday, cannot give accurate location or year. We cannot confirm that name/ is accurate.  	NEUROLOGY: non-focal  SKIN: No rashes or lesions    LABS:  No new labs              RADIOLOGY & ADDITIONAL TESTS:  Results Reviewed:   Imaging Personally Reviewed:  Electrocardiogram Personally Reviewed:    COORDINATION OF CARE:  Care Discussed with Consultants/Other Providers [Y/N]:  Prior or Outpatient Records Reviewed [Y/N]: PROGRESS NOTE:     Patient is a 74y old  Male who presents with a chief complaint of Dementia, unspecified type (2020 06:54)      SUBJECTIVE / OVERNIGHT EVENTS: NAEO. Feels well, no complaints. Eating and drinking well.    MEDICATIONS  (STANDING):  heparin   Injectable 5000 Unit(s) SubCutaneous every 12 hours  melatonin 6 milliGRAM(s) Oral at bedtime    MEDICATIONS  (PRN):      CAPILLARY BLOOD GLUCOSE        I&O's Summary    2020 07:01  -  2020 07:00  --------------------------------------------------------  IN: 0 mL / OUT: 850 mL / NET: -850 mL        PHYSICAL EXAM:  Vital Signs Last 24 Hrs  T(C): 36.8 (2020 05:54), Max: 37.1 (2020 21:07)  T(F): 98.3 (2020 05:54), Max: 98.7 (2020 21:07)  HR: 54 (2020 05:54) (54 - 67)  BP: 138/114 (2020 05:54) (115/53 - 138/114)  BP(mean): --  RR: 18 (2020 05:54) (16 - 18)  SpO2: 98% (2020 05:54) (97% - 100%)      GENERAL: appears cachectic, in no apparent distress, lying in bed  	HEAD:  Atraumatic, Normocephalic  	EYES: no conjunctival injection, no scleral icterus  	CHEST/LUNG: Clear to auscultation bilaterally; No wheeze  	HEART: Regular rate and rhythm; No murmurs, rubs, or gallops  	ABDOMEN: Soft, Nontender, Nondistended; Bowel sounds present  	EXTREMITIES:  2+ Peripheral Pulses, No clubbing, cyanosis, or edema  	PSYCH: unsure of baseline. only able to provide name and birthday, cannot give accurate location or year. We cannot confirm that name/ is accurate.  	NEUROLOGY: non-focal  SKIN: No rashes or lesions    LABS:  No new labs              RADIOLOGY & ADDITIONAL TESTS:  Results Reviewed:   Imaging Personally Reviewed:  Electrocardiogram Personally Reviewed:    COORDINATION OF CARE:  Care Discussed with Consultants/Other Providers [Y/N]:  Prior or Outpatient Records Reviewed [Y/N]:

## 2020-07-01 NOTE — PROGRESS NOTE ADULT - ASSESSMENT
75 yo M who denies PMH, presenting [place] side [place] the [place] by [place], found to have dementia and unable to provide a full history. 73 yo M who denies PMH, presenting [place] side [place], found to have dementia and unable to provide a full history awaiting dispo plans by MARCIA.

## 2020-07-02 DIAGNOSIS — Z29.9 ENCOUNTER FOR PROPHYLACTIC MEASURES, UNSPECIFIED: ICD-10-CM

## 2020-07-02 PROCEDURE — 99232 SBSQ HOSP IP/OBS MODERATE 35: CPT | Mod: GC

## 2020-07-02 RX ADMIN — HEPARIN SODIUM 5000 UNIT(S): 5000 INJECTION INTRAVENOUS; SUBCUTANEOUS at 05:16

## 2020-07-02 RX ADMIN — HEPARIN SODIUM 5000 UNIT(S): 5000 INJECTION INTRAVENOUS; SUBCUTANEOUS at 17:08

## 2020-07-02 RX ADMIN — Medication 6 MILLIGRAM(S): at 22:12

## 2020-07-02 NOTE — PROGRESS NOTE ADULT - ASSESSMENT
73 yo M who denies PMH, presenting [place] by [place], found to have dementia and unable to provide a full history awaiting dispo plans by MARCIA.

## 2020-07-02 NOTE — PROGRESS NOTE ADULT - SUBJECTIVE AND OBJECTIVE BOX
PROGRESS NOTE:   Authoted by Dr. Grady Cintron MD  Pager [phone] [facility], [id number] [facility]     Patient is a 74y old  Male who presents with a chief complaint of Dementia, unspecified type (01 Jul 2020 09:13)      SUBJECTIVE / OVERNIGHT EVENTS: The patient was seen and examined at bedside. No acute events overnight. the patient has no chest pain SOB, itching.     REVIEW OF SYSTEMS:    CONSTITUTIONAL: No weakness, fevers or chills  EYES/ENT: No visual changes;  No vertigo or throat pain   NECK: No pain or stiffness  RESPIRATORY: No cough, wheezing, hemoptysis; No shortness of breath  CARDIOVASCULAR: No chest pain or palpitations  GASTROINTESTINAL: No abdominal or epigastric pain. No nausea, vomiting, or hematemesis; No diarrhea or constipation. No melena or hematochezia.  GENITOURINARY: No dysuria, frequency or hematuria  NEUROLOGICAL: No numbness or weakness  SKIN: No itching, rashes    MEDICATIONS  (STANDING):  heparin   Injectable 5000 Unit(s) SubCutaneous every 12 hours  melatonin 6 milliGRAM(s) Oral at bedtime    PHYSICAL EXAM:  Vital Signs Last 24 Hrs  T(C): 36.9 (02 Jul 2020 05:21), Max: 37.1 (01 Jul 2020 22:09)  T(F): 98.4 (02 Jul 2020 05:21), Max: 98.7 (01 Jul 2020 22:09)  HR: 57 (02 Jul 2020 05:21) (57 - 59)  BP: 100/55 (02 Jul 2020 05:21) (100/55 - 109/57)  BP(mean): --  RR: 17 (02 Jul 2020 05:21) (16 - 18)  SpO2: 97% (02 Jul 2020 05:21) (97% - 100%)    CONSTITUTIONAL: NAD, cachectic  RESPIRATORY: Normal respiratory effort; lungs are clear to auscultation bilaterally  CARDIOVASCULAR: Regular rate and rhythm, normal S1 and S2, no murmur/rub/gallop; No lower extremity edema; Peripheral pulses are 2+ bilaterally  ABDOMEN: Nontender to palpation, normoactive bowel sounds, no rebound/guarding; No hepatosplenomegaly  MUSCLOSKELETAL: no clubbing or cyanosis of digits; no joint swelling or tenderness to palpation  PSYCH: A+O to person, place, and time; affect appropriate    COORDINATION OF CARE:  Care Discussed with Consultants/Other Providers [Y/N]: Care discussed with social work  Prior or Outpatient Records Reviewed [Y/N]: PROGRESS NOTE:   Authoted by Dr. Grady Cintron MD  Pager [phone] [facility], [id number] [facility]     Patient is a 74y old  Male who presents with a chief complaint of Dementia, unspecified type (2020 09:13)      SUBJECTIVE / OVERNIGHT EVENTS: The patient was seen and examined at bedside. No acute events overnight. the patient has no chest pain SOB, itching.     REVIEW OF SYSTEMS:    CONSTITUTIONAL: No weakness, fevers or chills  EYES/ENT: No visual changes;  No vertigo or throat pain   NECK: No pain or stiffness  RESPIRATORY: No cough, wheezing, hemoptysis; No shortness of breath  CARDIOVASCULAR: No chest pain or palpitations  GASTROINTESTINAL: No abdominal or epigastric pain. No nausea, vomiting, or hematemesis; No diarrhea or constipation. No melena or hematochezia.  GENITOURINARY: No dysuria, frequency or hematuria  NEUROLOGICAL: No numbness or weakness  SKIN: No itching, rashes    MEDICATIONS  (STANDING):  heparin   Injectable 5000 Unit(s) SubCutaneous every 12 hours  melatonin 6 milliGRAM(s) Oral at bedtime    PHYSICAL EXAM:  Vital Signs Last 24 Hrs  T(C): 36.9 (2020 05:21), Max: 37.1 (2020 22:09)  T(F): 98.4 (2020 05:21), Max: 98.7 (2020 22:09)  HR: 57 (2020 05:21) (57 - 59)  BP: 100/55 (2020 05:21) (100/55 - 109/57)  BP(mean): --  RR: 17 (2020 05:21) (16 - 18)  SpO2: 97% (2020 05:21) (97% - 100%)      GENERAL: appears cachectic, in no apparent distress, lying in bed  	HEAD:  Atraumatic, Normocephalic  	EYES: no conjunctival injection, no scleral icterus  	CHEST/LUNG: Clear to auscultation bilaterally; No wheeze  	HEART: Regular rate and rhythm; No murmurs, rubs, or gallops  	ABDOMEN: Soft, Nontender, Nondistended; Bowel sounds present  	EXTREMITIES:  2+ Peripheral Pulses, No clubbing, cyanosis, or edema  	PSYCH: unsure of baseline. only able to provide name and birthday, cannot give accurate location or year. We cannot confirm that name/ is accurate.  	NEUROLOGY: non-focal  SKIN: No rashes or lesions    COORDINATION OF CARE:  Care Discussed with Consultants/Other Providers [Y/N]: Care discussed with social work  Prior or Outpatient Records Reviewed [Y/N]:

## 2020-07-02 NOTE — PROGRESS NOTE ADULT - PROBLEM SELECTOR PLAN 2
Dispo: Social work working to find additional information on patient, family, and medical history  Transitions of Care Status:  1.  Name of PCP: No PCP  2.  PCP Contacted on Admission: [ ] Y    x] N    3.  PCP contacted at Discharge: [ ] Y    [ ] N    [ ] N/A  4.  Post-Discharge Appointment Date and Location:  5.  Summary of Handoff given to PCP:

## 2020-07-03 PROCEDURE — 99232 SBSQ HOSP IP/OBS MODERATE 35: CPT | Mod: GC

## 2020-07-03 RX ADMIN — Medication 6 MILLIGRAM(S): at 21:09

## 2020-07-03 RX ADMIN — HEPARIN SODIUM 5000 UNIT(S): 5000 INJECTION INTRAVENOUS; SUBCUTANEOUS at 05:21

## 2020-07-03 RX ADMIN — HEPARIN SODIUM 5000 UNIT(S): 5000 INJECTION INTRAVENOUS; SUBCUTANEOUS at 17:27

## 2020-07-03 NOTE — PROGRESS NOTE ADULT - SUBJECTIVE AND OBJECTIVE BOX
PROGRESS NOTE:     Patient is a 74y old  Male who presents with a chief complaint of Dementia, unspecified type (02 Jul 2020 09:01)      SUBJECTIVE / OVERNIGHT EVENTS: No acute events overnight. The patient feels well and is eating well.    REVIEW OF SYSTEMS:    CONSTITUTIONAL: No weakness, fevers or chills  EYES/ENT: No visual changes;  No vertigo or throat pain   NECK: No pain or stiffness  RESPIRATORY: No cough, wheezing, hemoptysis; No shortness of breath  CARDIOVASCULAR: No chest pain or palpitations  GASTROINTESTINAL: No abdominal or epigastric pain. No nausea, vomiting, or hematemesis; No diarrhea or constipation. No melena or hematochezia.  GENITOURINARY: No dysuria, frequency or hematuria  NEUROLOGICAL: No numbness or weakness  SKIN: No itching, rashes      MEDICATIONS  (STANDING):  heparin   Injectable 5000 Unit(s) SubCutaneous every 12 hours  melatonin 6 milliGRAM(s) Oral at bedtime    MEDICATIONS  (PRN):      CAPILLARY BLOOD GLUCOSE        I&O's Summary      PHYSICAL EXAM:  Vital Signs Last 24 Hrs  T(C): 36.7 (03 Jul 2020 05:20), Max: 37 (02 Jul 2020 12:33)  T(F): 98 (03 Jul 2020 05:20), Max: 98.6 (02 Jul 2020 12:33)  HR: 56 (03 Jul 2020 05:20) (56 - 77)  BP: 127/65 (03 Jul 2020 05:20) (122/65 - 129/62)  BP(mean): --  RR: 18 (03 Jul 2020 05:20) (16 - 18)  SpO2: 100% (03 Jul 2020 05:20) (96% - 100%)    CONSTITUTIONAL: NAD, well-developed  RESPIRATORY: Normal respiratory effort; lungs are clear to auscultation bilaterally  CARDIOVASCULAR: Regular rate and rhythm, normal S1 and S2, no murmur/rub/gallop; No lower extremity edema; Peripheral pulses are 2+ bilaterally  ABDOMEN: Nontender to palpation, normoactive bowel sounds, no rebound/guarding; No hepatosplenomegaly  MUSCLOSKELETAL: no clubbing or cyanosis of digits; no joint swelling or tenderness to palpation  PSYCH: A+O to person, place, and time; affect appropriate    LABS:                      RADIOLOGY & ADDITIONAL TESTS:  Results Reviewed:   Imaging Personally Reviewed:  Electrocardiogram Personally Reviewed:    COORDINATION OF CARE:  Care Discussed with Consultants/Other Providers [Y/N]:  Prior or Outpatient Records Reviewed [Y/N]: PROGRESS NOTE:     Patient is a 74y old  Male who presents with a chief complaint of Dementia, unspecified type (2020 09:01)      SUBJECTIVE / OVERNIGHT EVENTS: No acute events overnight. The patient feels well and is eating well.    REVIEW OF SYSTEMS:    CONSTITUTIONAL: No weakness, fevers or chills  EYES/ENT: No visual changes;  No vertigo or throat pain   NECK: No pain or stiffness  RESPIRATORY: No cough, wheezing, hemoptysis; No shortness of breath  CARDIOVASCULAR: No chest pain or palpitations  GASTROINTESTINAL: No abdominal or epigastric pain. No nausea, vomiting, or hematemesis; No diarrhea or constipation. No melena or hematochezia.  GENITOURINARY: No dysuria, frequency or hematuria  NEUROLOGICAL: No numbness or weakness  SKIN: No itching, rashes      MEDICATIONS  (STANDING):  heparin   Injectable 5000 Unit(s) SubCutaneous every 12 hours  melatonin 6 milliGRAM(s) Oral at bedtime    MEDICATIONS  (PRN):      CAPILLARY BLOOD GLUCOSE        I&O's Summary      PHYSICAL EXAM:  Vital Signs Last 24 Hrs  T(C): 36.7 (2020 05:20), Max: 37 (2020 12:33)  T(F): 98 (2020 05:20), Max: 98.6 (2020 12:33)  HR: 56 (2020 05:20) (56 - 77)  BP: 127/65 (2020 05:20) (122/65 - 129/62)  BP(mean): --  RR: 18 (2020 05:20) (16 - 18)  SpO2: 100% (2020 05:20) (96% - 100%)      GENERAL: appears cachectic, in no apparent distress, lying in bed  	HEAD:  Atraumatic, Normocephalic  	EYES: no conjunctival injection, no scleral icterus  	CHEST/LUNG: Clear to auscultation bilaterally; No wheeze  	HEART: Regular rate and rhythm; No murmurs, rubs, or gallops  	ABDOMEN: Soft, Nontender, Nondistended; Bowel sounds present  	EXTREMITIES:  2+ Peripheral Pulses, No clubbing, cyanosis, or edema  	PSYCH: unsure of baseline. only able to provide name and birthday, cannot give accurate location or year. We cannot confirm that name/ is accurate.  	NEUROLOGY: non-focal  SKIN: No rashes or lesions    LABS:                      RADIOLOGY & ADDITIONAL TESTS:  Results Reviewed:   Imaging Personally Reviewed:  Electrocardiogram Personally Reviewed:    COORDINATION OF CARE:  Care Discussed with Consultants/Other Providers [Y/N]:  Prior or Outpatient Records Reviewed [Y/N]:

## 2020-07-04 PROCEDURE — 99232 SBSQ HOSP IP/OBS MODERATE 35: CPT | Mod: GC

## 2020-07-04 RX ADMIN — HEPARIN SODIUM 5000 UNIT(S): 5000 INJECTION INTRAVENOUS; SUBCUTANEOUS at 05:07

## 2020-07-04 RX ADMIN — Medication 6 MILLIGRAM(S): at 21:10

## 2020-07-04 RX ADMIN — HEPARIN SODIUM 5000 UNIT(S): 5000 INJECTION INTRAVENOUS; SUBCUTANEOUS at 17:01

## 2020-07-04 NOTE — PROGRESS NOTE ADULT - SUBJECTIVE AND OBJECTIVE BOX
PROGRESS NOTE:     Patient is a 74y old  Male who presents with a chief complaint of Dementia, unspecified type (03 Jul 2020 08:49)          MEDICATIONS  (STANDING):  heparin   Injectable 5000 Unit(s) SubCutaneous every 12 hours  melatonin 6 milliGRAM(s) Oral at bedtime    MEDICATIONS  (PRN):      CAPILLARY BLOOD GLUCOSE        I&O's Summary      PHYSICAL EXAM:  Vital Signs Last 24 Hrs  T(C): 37.4 (04 Jul 2020 05:36), Max: 37.4 (04 Jul 2020 05:36)  T(F): 99.3 (04 Jul 2020 05:36), Max: 99.3 (04 Jul 2020 05:36)  HR: 61 (04 Jul 2020 05:36) (57 - 67)  BP: 117/62 (04 Jul 2020 05:36) (106/59 - 117/62)  BP(mean): --  RR: 17 (04 Jul 2020 05:36) (17 - 18)  SpO2: 98% (04 Jul 2020 05:36) (97% - 98%)    CONSTITUTIONAL: NAD, well-developed  RESPIRATORY: Normal respiratory effort; lungs are clear to auscultation bilaterally  CARDIOVASCULAR: Regular rate and rhythm, normal S1 and S2, no murmur/rub/gallop; No lower extremity edema; Peripheral pulses are 2+ bilaterally  ABDOMEN: Nontender to palpation, normoactive bowel sounds, no rebound/guarding; No hepatosplenomegaly  MUSCLOSKELETAL: no clubbing or cyanosis of digits; no joint swelling or tenderness to palpation  PSYCH: A+O to person, place, and time; affect appropriate    LABS:                      RADIOLOGY & ADDITIONAL TESTS:  Results Reviewed:   Imaging Personally Reviewed:  Electrocardiogram Personally Reviewed:    COORDINATION OF CARE:  Care Discussed with Consultants/Other Providers [Y/N]:  Prior or Outpatient Records Reviewed [Y/N]: PROGRESS NOTE:     Patient is a 74y old  Male who presents with a chief complaint of Dementia, unspecified type (03 Jul 2020 08:49)    No acute events       MEDICATIONS  (STANDING):  heparin   Injectable 5000 Unit(s) SubCutaneous every 12 hours  melatonin 6 milliGRAM(s) Oral at bedtime    MEDICATIONS  (PRN):      CAPILLARY BLOOD GLUCOSE        I&O's Summary      PHYSICAL EXAM:  Vital Signs Last 24 Hrs  T(C): 37.4 (04 Jul 2020 05:36), Max: 37.4 (04 Jul 2020 05:36)  T(F): 99.3 (04 Jul 2020 05:36), Max: 99.3 (04 Jul 2020 05:36)  HR: 61 (04 Jul 2020 05:36) (57 - 67)  BP: 117/62 (04 Jul 2020 05:36) (106/59 - 117/62)  BP(mean): --  RR: 17 (04 Jul 2020 05:36) (17 - 18)  SpO2: 98% (04 Jul 2020 05:36) (97% - 98%)    CONSTITUTIONAL: NAD, well-developed  RESPIRATORY: Normal respiratory effort; lungs are clear to auscultation bilaterally  CARDIOVASCULAR: Regular rate and rhythm, normal S1 and S2, no murmur/rub/gallop; No lower extremity edema; Peripheral pulses are 2+ bilaterally  ABDOMEN: Nontender to palpation, normoactive bowel sounds, no rebound/guarding; No hepatosplenomegaly  MUSCLOSKELETAL: no clubbing or cyanosis of digits; no joint swelling or tenderness to palpation  PSYCH: A+O to person, place, and time; affect appropriate    LABS:                      RADIOLOGY & ADDITIONAL TESTS:  Results Reviewed:   Imaging Personally Reviewed:  Electrocardiogram Personally Reviewed:    COORDINATION OF CARE:  Care Discussed with Consultants/Other Providers [Y/N]:  Prior or Outpatient Records Reviewed [Y/N]: PROGRESS NOTE:     Patient is a 74y old  Male who presents with a chief complaint of Dementia, unspecified type (03 Jul 2020 08:49)    No acute events overnight. Patient is eating well with no complaints.      MEDICATIONS  (STANDING):  heparin   Injectable 5000 Unit(s) SubCutaneous every 12 hours  melatonin 6 milliGRAM(s) Oral at bedtime    MEDICATIONS  (PRN):      CAPILLARY BLOOD GLUCOSE        I&O's Summary      PHYSICAL EXAM:  Vital Signs Last 24 Hrs  T(C): 37.4 (04 Jul 2020 05:36), Max: 37.4 (04 Jul 2020 05:36)  T(F): 99.3 (04 Jul 2020 05:36), Max: 99.3 (04 Jul 2020 05:36)  HR: 61 (04 Jul 2020 05:36) (57 - 67)  BP: 117/62 (04 Jul 2020 05:36) (106/59 - 117/62)  BP(mean): --  RR: 17 (04 Jul 2020 05:36) (17 - 18)  SpO2: 98% (04 Jul 2020 05:36) (97% - 98%)    CONSTITUTIONAL: NAD, well-developed  RESPIRATORY: Normal respiratory effort; lungs are clear to auscultation bilaterally  CARDIOVASCULAR: Regular rate and rhythm, normal S1 and S2, no murmur/rub/gallop; No lower extremity edema; Peripheral pulses are 2+ bilaterally  ABDOMEN: Nontender to palpation, normoactive bowel sounds, no rebound/guarding; No hepatosplenomegaly  MUSCLOSKELETAL: no clubbing or cyanosis of digits; no joint swelling or tenderness to palpation  PSYCH: A+O to person, place, and time; affect appropriate    LABS:                      RADIOLOGY & ADDITIONAL TESTS:  Results Reviewed:   Imaging Personally Reviewed:  Electrocardiogram Personally Reviewed:    COORDINATION OF CARE:  Care Discussed with Consultants/Other Providers [Y/N]:  Prior or Outpatient Records Reviewed [Y/N]: PROGRESS NOTE:     Patient is a 74y old  Male who presents with a chief complaint of Dementia, unspecified type (03 Jul 2020 08:49)    No acute events overnight. Patient is eating well with no complaints.  ROS otherwise negative    MEDICATIONS  (STANDING):  heparin   Injectable 5000 Unit(s) SubCutaneous every 12 hours  melatonin 6 milliGRAM(s) Oral at bedtime    MEDICATIONS  (PRN):      CAPILLARY BLOOD GLUCOSE        I&O's Summary      PHYSICAL EXAM:  Vital Signs Last 24 Hrs  T(C): 37.4 (04 Jul 2020 05:36), Max: 37.4 (04 Jul 2020 05:36)  T(F): 99.3 (04 Jul 2020 05:36), Max: 99.3 (04 Jul 2020 05:36)  HR: 61 (04 Jul 2020 05:36) (57 - 67)  BP: 117/62 (04 Jul 2020 05:36) (106/59 - 117/62)  BP(mean): --  RR: 17 (04 Jul 2020 05:36) (17 - 18)  SpO2: 98% (04 Jul 2020 05:36) (97% - 98%)    CONSTITUTIONAL: NAD, well-developed  RESPIRATORY: Normal respiratory effort; lungs are clear to auscultation bilaterally  CARDIOVASCULAR: Regular rate and rhythm, normal S1 and S2, no murmur/rub/gallop; No lower extremity edema; Peripheral pulses are 2+ bilaterally  ABDOMEN: Nontender to palpation, normoactive bowel sounds, no rebound/guarding; No hepatosplenomegaly  MUSCLOSKELETAL: no clubbing or cyanosis of digits; no joint swelling or tenderness to palpation  PSYCH: A+O to person, place, and time; affect appropriate    LABS:                      RADIOLOGY & ADDITIONAL TESTS:  Results Reviewed:   Imaging Personally Reviewed:  Electrocardiogram Personally Reviewed:    COORDINATION OF CARE:  Care Discussed with Consultants/Other Providers [Y/N]:  Prior or Outpatient Records Reviewed [Y/N]: PROGRESS NOTE:     Patient is a 74y old  Male who presents with a chief complaint of Dementia, unspecified type (2020 08:49)    No acute events overnight. Patient is eating well with no complaints.  ROS otherwise negative    MEDICATIONS  (STANDING):  heparin   Injectable 5000 Unit(s) SubCutaneous every 12 hours  melatonin 6 milliGRAM(s) Oral at bedtime    MEDICATIONS  (PRN):      CAPILLARY BLOOD GLUCOSE        I&O's Summary      PHYSICAL EXAM:  Vital Signs Last 24 Hrs  T(C): 37.4 (2020 05:36), Max: 37.4 (2020 05:36)  T(F): 99.3 (2020 05:36), Max: 99.3 (2020 05:36)  HR: 61 (2020 05:36) (57 - 67)  BP: 117/62 (2020 05:36) (106/59 - 117/62)  BP(mean): --  RR: 17 (2020 05:36) (17 - 18)  SpO2: 98% (2020 05:36) (97% - 98%)    GENERAL: appears cachectic, in no apparent distress, lying in bed  	HEAD:  Atraumatic, Normocephalic  	EYES: no conjunctival injection, no scleral icterus  	CHEST/LUNG: Clear to auscultation bilaterally; No wheeze  	HEART: Regular rate and rhythm; No murmurs, rubs, or gallops  	ABDOMEN: Soft, Nontender, Nondistended; Bowel sounds present  	EXTREMITIES:  2+ Peripheral Pulses, No clubbing, cyanosis, or edema  	PSYCH: unsure of baseline. only able to provide name and birthday, cannot give accurate location or year. We cannot confirm that name/ is accurate.  	NEUROLOGY: non-focal  SKIN: No rashes or lesions    LABS:                      RADIOLOGY & ADDITIONAL TESTS:  Results Reviewed:   Imaging Personally Reviewed:  Electrocardiogram Personally Reviewed:    COORDINATION OF CARE:  Care Discussed with Consultants/Other Providers [Y/N]:  Prior or Outpatient Records Reviewed [Y/N]:

## 2020-07-05 ENCOUNTER — TRANSCRIPTION ENCOUNTER (OUTPATIENT)
Age: 75
End: 2020-07-05

## 2020-07-05 PROCEDURE — 99232 SBSQ HOSP IP/OBS MODERATE 35: CPT | Mod: GC

## 2020-07-05 RX ADMIN — HEPARIN SODIUM 5000 UNIT(S): 5000 INJECTION INTRAVENOUS; SUBCUTANEOUS at 17:34

## 2020-07-05 RX ADMIN — HEPARIN SODIUM 5000 UNIT(S): 5000 INJECTION INTRAVENOUS; SUBCUTANEOUS at 05:02

## 2020-07-05 RX ADMIN — Medication 6 MILLIGRAM(S): at 21:05

## 2020-07-05 NOTE — DISCHARGE NOTE PROVIDER - PROVIDER TOKENS
FREE:[LAST:[[facility] [place]],PHONE:[phone]],FAX:[(   [phone]    -],ADDRESS:[[address] [place] [address]],FOLLOWUP:[1 month]]

## 2020-07-05 NOTE — DISCHARGE NOTE PROVIDER - NSFOLLOWUPCLINICS_GEN_ALL_ED_FT
[facility] [place]  Internal Medicine  [address] [place] [address]  Phone: [phone]  Fax: [phone]  Follow Up Time: 1 month

## 2020-07-05 NOTE — PROGRESS NOTE ADULT - SUBJECTIVE AND OBJECTIVE BOX
PROGRESS NOTE:     Patient is a 74y old  Male who presents with a chief complaint of Dementia, unspecified type (04 Jul 2020 06:41)      SUBJECTIVE / OVERNIGHT EVENTS:  No acute events overnight. Patient is eating well and has no complaints.        MEDICATIONS  (STANDING):  heparin   Injectable 5000 Unit(s) SubCutaneous every 12 hours  melatonin 6 milliGRAM(s) Oral at bedtime    MEDICATIONS  (PRN):      CAPILLARY BLOOD GLUCOSE        I&O's Summary      PHYSICAL EXAM:  Vital Signs Last 24 Hrs  T(C): 36.6 (05 Jul 2020 05:41), Max: 37.2 (04 Jul 2020 21:10)  T(F): 97.9 (05 Jul 2020 05:41), Max: 98.9 (04 Jul 2020 21:10)  HR: 84 (05 Jul 2020 05:41) (63 - 84)  BP: 128/84 (05 Jul 2020 05:41) (122/59 - 129/61)  BP(mean): --  RR: 17 (05 Jul 2020 05:41) (16 - 17)  SpO2: 98% (05 Jul 2020 05:41) (96% - 98%)    CONSTITUTIONAL: NAD, well-developed  RESPIRATORY: Normal respiratory effort; lungs are clear to auscultation bilaterally  CARDIOVASCULAR: Regular rate and rhythm, normal S1 and S2, no murmur/rub/gallop; No lower extremity edema; Peripheral pulses are 2+ bilaterally  ABDOMEN: Nontender to palpation, normoactive bowel sounds, no rebound/guarding; No hepatosplenomegaly  MUSCLOSKELETAL: no clubbing or cyanosis of digits; no joint swelling or tenderness to palpation  PSYCH: A+O to person, place, and time; affect appropriate    LABS:  No new labs                    RADIOLOGY & ADDITIONAL TESTS:  Results Reviewed:   Imaging Personally Reviewed:  Electrocardiogram Personally Reviewed:    COORDINATION OF CARE:  Care Discussed with Consultants/Other Providers [Y/N]:  Prior or Outpatient Records Reviewed [Y/N]:  \ PROGRESS NOTE:     Patient is a 74y old  Male who presents with a chief complaint of Dementia, unspecified type (2020 06:41)      SUBJECTIVE / OVERNIGHT EVENTS:  No acute events overnight. Patient is eating well and has no complaints. Responding to internal stimuli this morning.        MEDICATIONS  (STANDING):  heparin   Injectable 5000 Unit(s) SubCutaneous every 12 hours  melatonin 6 milliGRAM(s) Oral at bedtime    MEDICATIONS  (PRN):      CAPILLARY BLOOD GLUCOSE        I&O's Summary      PHYSICAL EXAM:  Vital Signs Last 24 Hrs  T(C): 36.6 (2020 05:41), Max: 37.2 (2020 21:10)  T(F): 97.9 (2020 05:41), Max: 98.9 (2020 21:10)  HR: 84 (2020 05:41) (63 - 84)  BP: 128/84 (2020 05:41) (122/59 - 129/61)  BP(mean): --  RR: 17 (2020 05:41) (16 - 17)  SpO2: 98% (2020 05:41) (96% - 98%)      GENERAL: appears cachectic, in no apparent distress, lying in bed  	HEAD:  Atraumatic, Normocephalic  	EYES: no conjunctival injection, no scleral icterus  	CHEST/LUNG: Clear to auscultation bilaterally; No wheeze  	HEART: Regular rate and rhythm; No murmurs, rubs, or gallops  	ABDOMEN: Soft, Nontender, Nondistended; Bowel sounds present  	EXTREMITIES:  2+ Peripheral Pulses, No clubbing, cyanosis, or edema  	PSYCH: unsure of baseline. only able to provide name and birthday, cannot give accurate location or year. We cannot confirm that name/ is accurate.  	NEUROLOGY: non-focal  SKIN: No rashes or lesions  LABS:  No new labs                    RADIOLOGY & ADDITIONAL TESTS:  Results Reviewed:   Imaging Personally Reviewed:  Electrocardiogram Personally Reviewed:    COORDINATION OF CARE:  Care Discussed with Consultants/Other Providers [Y/N]:  Prior or Outpatient Records Reviewed [Y/N]:  \

## 2020-07-05 NOTE — DISCHARGE NOTE PROVIDER - HOSPITAL COURSE
73yo? man found walking [place] the side of the highway and was brought in by EMS. He was unable to provide a coherent history besides his name and birthday. He denied headache, chest pain, shortness of breath, nausea, vomiting, pain, swelling and had no complaints. He was evaluated by Social Work and found... 73yo? man found walking [place] the side of the highway and was brought in by EMS. He was unable to provide a coherent history besides his name and birthday. He denied headache, chest pain, shortness of breath, nausea, vomiting, pain, swelling and had no complaints. He was found to have body lice on two occasions and tx with permethrin x2. His vitals and once weekly labs have been stable. 73yo? man found walking [place] the side of the highway and was brought in by EMS. He was unable to provide a coherent history besides his name and birthday. He denied headache, chest pain, shortness of breath, nausea, vomiting, pain, swelling and had no complaints. He was found to have body lice on two occasions and tx with permethrin x3. His vitals and once weekly labs have been stable. His lipid panel showed no abnormalities. His vitamin D was slightly low for which he was started on multivitamin and 75yo? man found walking on the side of the highway and was brought in by EMS. He was unable to provide a coherent history besides his name and birthday. He denied headache, chest pain, shortness of breath, nausea, vomiting, pain, swelling and had no complaints. He was found to have body lice on two occasions and tx with permethrin x3. His vitals and once weekly labs have been stable. His lipid panel showed no abnormalities. His vitamin D was slightly low for which he was started on multivitamin and encouraged to have better nutrition overall. His toxicology was positive for cannabinoids. 73yo man found walking [place] the side of the highway and was brought in by EMS. He was unable to provide a coherent history besides his name and birthday. He denied headache, chest pain, shortness of breath, nausea, vomiting, pain, swelling and had no complaints. He was found to have body lice on two occasions and tx with permethrin x3. His vitals and once weekly labs have been stable. His lipid panel showed no abnormalities. His vitamin D was slightly low for which he was started on multivitamin and encouraged to have better nutrition overall. His toxicology was positive for cannabinoids. Pt is stable and ready to be discharged.

## 2020-07-05 NOTE — DISCHARGE NOTE PROVIDER - NSDCCPCAREPLAN_GEN_ALL_CORE_FT
PRINCIPAL DISCHARGE DIAGNOSIS  Diagnosis: Encephalopathy  Assessment and Plan of Treatment: You were found to be wondering in the highway in a state of delirium where you were unable to identify any information about you. Your toxicology was found to be positive for cannabinoids which might be a cause for your altered mental status. No other causes were identified.      SECONDARY DISCHARGE DIAGNOSES  Diagnosis: Pediculosis corporis  Assessment and Plan of Treatment: Upon admission you were found to have lice. You were treated with permethrin three times to help clear the infestation.

## 2020-07-05 NOTE — PROGRESS NOTE ADULT - ASSESSMENT
75 yo M who denies PMH, presenting after being found [place] the side of the highway by EMS, found to have dementia and unable to provide a full history awaiting dispo plans by MARCIA.

## 2020-07-05 NOTE — DISCHARGE NOTE PROVIDER - NSDCMRMEDTOKEN_GEN_ALL_CORE_FT
melatonin 3 mg oral tablet: 2 tab(s) orally once a day (at bedtime)  Multiple Vitamins oral tablet: 1 tab(s) orally once a day

## 2020-07-05 NOTE — DISCHARGE NOTE PROVIDER - CARE PROVIDER_API CALL
[facility] [place],   [address] [place] [address]  Phone: [phone]  Fax: (   )    -  Follow Up Time: 1 month

## 2020-07-06 PROCEDURE — 99232 SBSQ HOSP IP/OBS MODERATE 35: CPT

## 2020-07-06 RX ADMIN — HEPARIN SODIUM 5000 UNIT(S): 5000 INJECTION INTRAVENOUS; SUBCUTANEOUS at 05:44

## 2020-07-06 RX ADMIN — Medication 6 MILLIGRAM(S): at 21:18

## 2020-07-06 RX ADMIN — HEPARIN SODIUM 5000 UNIT(S): 5000 INJECTION INTRAVENOUS; SUBCUTANEOUS at 17:29

## 2020-07-06 NOTE — PROGRESS NOTE ADULT - SUBJECTIVE AND OBJECTIVE BOX
Georgiana Brand PGY1    Patient is a 74y old  Male who presents with a chief complaint of Dementia, unspecified type (05 Jul 2020 14:11)      SUBJECTIVE / OVERNIGHT EVENTS:  ON: nothing  AM: responds appropriately to questions, calm. Not in pain. Not short of breath.     MEDICATIONS  (STANDING):  heparin   Injectable 5000 Unit(s) SubCutaneous every 12 hours  melatonin 6 milliGRAM(s) Oral at bedtime    MEDICATIONS  (PRN):      CAPILLARY BLOOD GLUCOSE        I&O's Summary    05 Jul 2020 07:01  -  06 Jul 2020 07:00  --------------------------------------------------------  IN: 0 mL / OUT: 300 mL / NET: -300 mL        Vital Signs Last 24 Hrs  T(C): 37 (06 Jul 2020 12:10), Max: 37 (06 Jul 2020 12:10)  T(F): 98.6 (06 Jul 2020 12:10), Max: 98.6 (06 Jul 2020 12:10)  HR: 55 (06 Jul 2020 12:10) (55 - 62)  BP: 123/50 (06 Jul 2020 12:10) (100/50 - 123/50)  BP(mean): --  RR: 17 (06 Jul 2020 12:10) (16 - 17)  SpO2: 109% (06 Jul 2020 12:10) (97% - 109%)    PHYSICAL EXAM:  GENERAL: NAD, well-developed, well-nourished  HEAD: Atraumatic, Normocephalic  EYES: EOMI, PERRLA, conjunctiva and sclera clear  NECK: Supple, No JVD  CHEST/LUNG: Clear to auscultation bilaterally; No wheezes or crackles  HEART: Normal S1/S2; Regular rate and rhythm; No murmurs, rubs, or gallops  ABDOMEN: Soft, Nontender, Nondistended; Bowel sounds present  EXTREMITIES: 2+ Peripheral Pulses; No clubbing, cyanosis, or edema  PSYCH: A&Ox3  NEUROLOGY: no focal neurologic deficit  SKIN: No rashes or lesions    LABS:                     RADIOLOGY & ADDITIONAL TESTS:    Imaging Personally Reviewed:    Consultant(s) Notes Reviewed:      Care Discussed with Consultants/Other Providers: Georgiana Brand PGY1    Patient is a 74y old  Male who presents with a chief complaint of Dementia, unspecified type (05 Jul 2020 14:11)    SUBJECTIVE / OVERNIGHT EVENTS:  ON: nothing  AM: responds appropriately to questions, calm. Not in pain. Not short of breath.     MEDICATIONS  (STANDING):  heparin   Injectable 5000 Unit(s) SubCutaneous every 12 hours  melatonin 6 milliGRAM(s) Oral at bedtime    MEDICATIONS  (PRN):      CAPILLARY BLOOD GLUCOSE        I&O's Summary    05 Jul 2020 07:01  -  06 Jul 2020 07:00  --------------------------------------------------------  IN: 0 mL / OUT: 300 mL / NET: -300 mL        Vital Signs Last 24 Hrs  T(C): 37 (06 Jul 2020 12:10), Max: 37 (06 Jul 2020 12:10)  T(F): 98.6 (06 Jul 2020 12:10), Max: 98.6 (06 Jul 2020 12:10)  HR: 55 (06 Jul 2020 12:10) (55 - 62)  BP: 123/50 (06 Jul 2020 12:10) (100/50 - 123/50)  BP(mean): --  RR: 17 (06 Jul 2020 12:10) (16 - 17)  SpO2: 109% (06 Jul 2020 12:10) (97% - 109%)    PHYSICAL EXAM:  GENERAL: NAD, well-developed, well-nourished  HEAD: Atraumatic, Normocephalic  EYES: EOMI, PERRLA, conjunctiva and sclera clear  NECK: Supple, No JVD  CHEST/LUNG: Clear to auscultation bilaterally; No wheezes or crackles  HEART: Normal S1/S2; Regular rate and rhythm; No murmurs, rubs, or gallops  ABDOMEN: Soft, Nontender, Nondistended; Bowel sounds present  EXTREMITIES: 2+ Peripheral Pulses; No clubbing, cyanosis, or edema  PSYCH: A&Ox3  NEUROLOGY: no focal neurologic deficit  SKIN: No rashes or lesions    LABS:                     RADIOLOGY & ADDITIONAL TESTS:    Imaging Personally Reviewed:    Consultant(s) Notes Reviewed:      Care Discussed with Consultants/Other Providers:

## 2020-07-06 NOTE — PROGRESS NOTE ADULT - PROBLEM SELECTOR PLAN 1
CT head showed no acute pathological changes. UDS is only positive for cannabinoids  Plan:  -HIV negative  -Attempting to find additional information re: history with social work. Police report filed  -Monitor for any acute mental status changes  - labs 7/7 (once a week labs)

## 2020-07-06 NOTE — PROGRESS NOTE ADULT - PROBLEM SELECTOR PLAN 4
Mild normocytic anemia (Hgb 12)  Plan:  -B12 and Folate normal Patient appears cachectic. Unable to provide history about recent weight changes. Chest xray unremarkable. CMP unremarkable.  Plan:  -Hep B negative  -Hep C negative  -HIV negative

## 2020-07-06 NOTE — PROGRESS NOTE ADULT - PROBLEM SELECTOR PLAN 5
Patient appears cachectic. Unable to provide history about recent weight changes. Chest xray unremarkable. CMP unremarkable.  Plan:  -Hep B negative  -Hep C negative  -HIV negative Patient admitted to using marijuana and cigarettes. Denies alcohol use and has negative ethanol tox screen.  Plan:  -Monitor for acute mental status changes

## 2020-07-06 NOTE — PROGRESS NOTE ADULT - PROBLEM SELECTOR PLAN 6
Patient admitted to using marijuana and cigarettes. Denies alcohol use and has negative ethanol tox screen.  Plan:  -Monitor for acute mental status changes DVT prophylaxis: subq heparin

## 2020-07-06 NOTE — PROGRESS NOTE ADULT - PROBLEM SELECTOR PLAN 3
Will evaluate specimen when placed at bedside  -S/p permethrin  -Derm consult if still finding insects Mild normocytic anemia (Hgb 12)  Plan:  -B12 and Folate normal

## 2020-07-06 NOTE — PROGRESS NOTE ADULT - PROBLEM SELECTOR PLAN 7
DVT prophylaxis: subq heparin Dispo: Social work working to find additional information on patient, family, and medical history  Transitions of Care Status:  1.  Name of PCP: No PCP  2.  PCP Contacted on Admission: [ ] Y    x] N    3.  PCP contacted at Discharge: [ ] Y    [ ] N    [ ] N/A  4.  Post-Discharge Appointment Date and Location:  5.  Summary of Handoff given to PCP:

## 2020-07-06 NOTE — PROGRESS NOTE ADULT - PROBLEM SELECTOR PLAN 2
Dispo: Social work working to find additional information on patient, family, and medical history  Transitions of Care Status:  1.  Name of PCP: No PCP  2.  PCP Contacted on Admission: [ ] Y    x] N    3.  PCP contacted at Discharge: [ ] Y    [ ] N    [ ] N/A  4.  Post-Discharge Appointment Date and Location:  5.  Summary of Handoff given to PCP: Will evaluate specimen when placed at bedside  -S/p permethrin  -Derm consult if new insects found

## 2020-07-07 LAB
ANION GAP SERPL CALC-SCNC: 11 MMO/L — SIGNIFICANT CHANGE UP (ref 7–14)
BUN SERPL-MCNC: 34 MG/DL — HIGH (ref 7–23)
CALCIUM SERPL-MCNC: 9.7 MG/DL — SIGNIFICANT CHANGE UP (ref 8.4–10.5)
CHLORIDE SERPL-SCNC: 102 MMOL/L — SIGNIFICANT CHANGE UP (ref 98–107)
CO2 SERPL-SCNC: 26 MMOL/L — SIGNIFICANT CHANGE UP (ref 22–31)
CREAT SERPL-MCNC: 0.83 MG/DL — SIGNIFICANT CHANGE UP (ref 0.5–1.3)
GLUCOSE SERPL-MCNC: 83 MG/DL — SIGNIFICANT CHANGE UP (ref 70–99)
HCT VFR BLD CALC: 37.6 % — LOW (ref 39–50)
HGB BLD-MCNC: 12 G/DL — LOW (ref 13–17)
MCHC RBC-ENTMCNC: 30.9 PG — SIGNIFICANT CHANGE UP (ref 27–34)
MCHC RBC-ENTMCNC: 31.9 % — LOW (ref 32–36)
MCV RBC AUTO: 96.9 FL — SIGNIFICANT CHANGE UP (ref 80–100)
NRBC # FLD: 0 K/UL — SIGNIFICANT CHANGE UP (ref 0–0)
PLATELET # BLD AUTO: 241 K/UL — SIGNIFICANT CHANGE UP (ref 150–400)
PMV BLD: 10.2 FL — SIGNIFICANT CHANGE UP (ref 7–13)
POTASSIUM SERPL-MCNC: 4.6 MMOL/L — SIGNIFICANT CHANGE UP (ref 3.5–5.3)
POTASSIUM SERPL-SCNC: 4.6 MMOL/L — SIGNIFICANT CHANGE UP (ref 3.5–5.3)
RBC # BLD: 3.88 M/UL — LOW (ref 4.2–5.8)
RBC # FLD: 13.1 % — SIGNIFICANT CHANGE UP (ref 10.3–14.5)
SODIUM SERPL-SCNC: 139 MMOL/L — SIGNIFICANT CHANGE UP (ref 135–145)
WBC # BLD: 7.13 K/UL — SIGNIFICANT CHANGE UP (ref 3.8–10.5)
WBC # FLD AUTO: 7.13 K/UL — SIGNIFICANT CHANGE UP (ref 3.8–10.5)

## 2020-07-07 PROCEDURE — 99232 SBSQ HOSP IP/OBS MODERATE 35: CPT

## 2020-07-07 RX ADMIN — Medication 6 MILLIGRAM(S): at 21:01

## 2020-07-07 RX ADMIN — HEPARIN SODIUM 5000 UNIT(S): 5000 INJECTION INTRAVENOUS; SUBCUTANEOUS at 17:03

## 2020-07-07 RX ADMIN — HEPARIN SODIUM 5000 UNIT(S): 5000 INJECTION INTRAVENOUS; SUBCUTANEOUS at 05:00

## 2020-07-07 NOTE — PROGRESS NOTE ADULT - SUBJECTIVE AND OBJECTIVE BOX
Georgiana Brand PGY1    Patient is a 74y old  Male who presents with a chief complaint of Dementia, unspecified type (06 Jul 2020 14:02)      SUBJECTIVE / OVERNIGHT EVENTS:    MEDICATIONS  (STANDING):  heparin   Injectable 5000 Unit(s) SubCutaneous every 12 hours  melatonin 6 milliGRAM(s) Oral at bedtime    MEDICATIONS  (PRN):      CAPILLARY BLOOD GLUCOSE        I&O's Summary      Vital Signs Last 24 Hrs  T(C): 36.5 (07 Jul 2020 05:00), Max: 37 (06 Jul 2020 12:10)  T(F): 97.7 (07 Jul 2020 05:00), Max: 98.6 (06 Jul 2020 12:10)  HR: 57 (07 Jul 2020 05:00) (55 - 60)  BP: 112/50 (07 Jul 2020 05:00) (112/50 - 125/53)  BP(mean): --  RR: 16 (07 Jul 2020 05:00) (16 - 18)  SpO2: 97% (07 Jul 2020 05:00) (97% - 109%)    PHYSICAL EXAM:  GENERAL: NAD, well-developed, well-nourished  HEAD: Atraumatic, Normocephalic  EYES: EOMI, PERRLA, conjunctiva and sclera clear  NECK: Supple, No JVD  CHEST/LUNG: Clear to auscultation bilaterally; No wheezes or crackles  HEART: Normal S1/S2; Regular rate and rhythm; No murmurs, rubs, or gallops  ABDOMEN: Soft, Nontender, Nondistended; Bowel sounds present  EXTREMITIES: 2+ Peripheral Pulses; No clubbing, cyanosis, or edema  PSYCH: A&Ox3  NEUROLOGY: no focal neurologic deficit  SKIN: No rashes or lesions    LABS:                     RADIOLOGY & ADDITIONAL TESTS:    Imaging Personally Reviewed:    Consultant(s) Notes Reviewed:      Care Discussed with Consultants/Other Providers: Georgiana Brand PGY1    Patient is a 74y old  Male who presents with a chief complaint of Dementia, unspecified type (07 Jul 2020 07:35)      SUBJECTIVE / OVERNIGHT EVENTS:  overnight: nothing  am: states he is not in pain, feels well, oriented and alert.   MEDICATIONS  (STANDING):  heparin   Injectable 5000 Unit(s) SubCutaneous every 12 hours  melatonin 6 milliGRAM(s) Oral at bedtime    MEDICATIONS  (PRN):      CAPILLARY BLOOD GLUCOSE        I&O's Summary      Vital Signs Last 24 Hrs  T(C): 36.5 (07 Jul 2020 05:00), Max: 37 (06 Jul 2020 12:10)  T(F): 97.7 (07 Jul 2020 05:00), Max: 98.6 (06 Jul 2020 12:10)  HR: 57 (07 Jul 2020 05:00) (55 - 60)  BP: 112/50 (07 Jul 2020 05:00) (112/50 - 125/53)  BP(mean): --  RR: 16 (07 Jul 2020 05:00) (16 - 18)  SpO2: 97% (07 Jul 2020 05:00) (97% - 109%)    PHYSICAL EXAM:  GENERAL: NAD, well-developed, well-nourished  HEAD: Atraumatic, Normocephalic  EYES: EOMI, PERRLA, conjunctiva and sclera clear  NECK: Supple, No JVD  CHEST/LUNG: Clear to auscultation bilaterally; No wheezes or crackles  HEART: Normal S1/S2; Regular rate and rhythm; No murmurs, rubs, or gallops  ABDOMEN: Soft, Nontender, Nondistended; Bowel sounds present  EXTREMITIES: 2+ Peripheral Pulses; No clubbing, cyanosis, or edema  PSYCH: A&Ox3  NEUROLOGY: no focal neurologic deficit  SKIN: No rashes or lesions    LABS:                        12.0   7.13  )-----------( 241      ( 07 Jul 2020 05:56 )             37.6      07-07    139  |  102  |  34<H>  ----------------------------<  83  4.6   |  26  |  0.83    Ca    9.7      07 Jul 2020 05:51                RADIOLOGY & ADDITIONAL TESTS:    Imaging Personally Reviewed:    Consultant(s) Notes Reviewed:      Care Discussed with Consultants/Other Providers:

## 2020-07-07 NOTE — PROGRESS NOTE ADULT - PROBLEM SELECTOR PLAN 1
CT head showed no acute pathological changes. UDS is only positive for cannabinoids  Plan:  -HIV negative  -Attempting to find additional information re: history with social work. Police report filed  -Monitor for any acute mental status changes  - labs 7/7 (once a week labs) CT head showed no acute pathological changes. UDS is only positive for cannabinoids  Plan:  -HIV negative  -Attempting to find additional information re: history with social work. Police report filed  -Monitor for any acute mental status changes  - once a week labs

## 2020-07-07 NOTE — PROGRESS NOTE ADULT - ASSESSMENT
73 yo M who denies PMH, presenting after [place] the side of the [place] by EMS, found to have dementia and unable to provide a full history awaiting dispo plans by MARCIA. 75 yo M who denies PMH, presenting after [place] side [place] the [place] by EMS, found to have dementia and unable to provide a full history awaiting dispo plans by SW. Labs 7/7 micah.

## 2020-07-08 PROCEDURE — 99232 SBSQ HOSP IP/OBS MODERATE 35: CPT | Mod: GC

## 2020-07-08 RX ADMIN — HEPARIN SODIUM 5000 UNIT(S): 5000 INJECTION INTRAVENOUS; SUBCUTANEOUS at 17:47

## 2020-07-08 RX ADMIN — Medication 6 MILLIGRAM(S): at 21:12

## 2020-07-08 RX ADMIN — HEPARIN SODIUM 5000 UNIT(S): 5000 INJECTION INTRAVENOUS; SUBCUTANEOUS at 05:22

## 2020-07-08 NOTE — PROGRESS NOTE ADULT - ASSESSMENT
75 yo M who denies PMH, presenting [place] side [place] the [place] by EMS, found to have dementia and unable to provide a full history awaiting dispo plans by SW. Labs 7/7 micah.

## 2020-07-08 NOTE — PROGRESS NOTE ADULT - SUBJECTIVE AND OBJECTIVE BOX
Georgiana Brand PGY1    Patient is a 74y old  Male who presents with a chief complaint of Dementia, unspecified type (07 Jul 2020 07:35)      SUBJECTIVE / OVERNIGHT EVENTS:  overnight: no events  AM: feeling well, no pain. No chest pain or shortness of breath.     MEDICATIONS  (STANDING):  heparin   Injectable 5000 Unit(s) SubCutaneous every 12 hours  melatonin 6 milliGRAM(s) Oral at bedtime    MEDICATIONS  (PRN):      CAPILLARY BLOOD GLUCOSE        I&O's Summary      Vital Signs Last 24 Hrs  T(C): 36.8 (08 Jul 2020 05:21), Max: 36.9 (07 Jul 2020 21:00)  T(F): 98.2 (08 Jul 2020 05:21), Max: 98.4 (07 Jul 2020 21:00)  HR: 60 (08 Jul 2020 05:21) (56 - 60)  BP: 120/55 (08 Jul 2020 05:21) (105/55 - 120/55)  BP(mean): --  RR: 16 (08 Jul 2020 05:21) (16 - 17)  SpO2: 100% (08 Jul 2020 05:21) (97% - 100%)    PHYSICAL EXAM:  general: emancipated, lying down, appears comfortable  HEAD: Atraumatic, Normocephalic  EYES: EOMI, PERRLA, conjunctiva and sclera clear  NECK: Supple, No JVD  CHEST/LUNG: Clear to auscultation bilaterally; No wheezes or crackles  HEART: Normal S1/S2; Regular rate and rhythm; No murmurs, rubs, or gallops  ABDOMEN: Soft, Nontender, Nondistended; Bowel sounds present  EXTREMITIES: 2+ Peripheral Pulses; No clubbing, cyanosis, or edema  PSYCH: A&Ox3  NEUROLOGY: no focal neurologic deficit  SKIN: No rashes or lesions  LABS:                        12.0   7.13  )-----------( 241      ( 07 Jul 2020 05:56 )             37.6      07-07    139  |  102  |  34<H>  ----------------------------<  83  4.6   |  26  |  0.83    Ca    9.7      07 Jul 2020 05:51                RADIOLOGY & ADDITIONAL TESTS:    Imaging Personally Reviewed:    Consultant(s) Notes Reviewed:      Care Discussed with Consultants/Other Providers:

## 2020-07-08 NOTE — PROGRESS NOTE ADULT - PROBLEM SELECTOR PLAN 1
CT head showed no acute pathological changes. UDS is only positive for cannabinoids  Plan:  -HIV negative  -Attempting to find additional information re: history with social work. Police report filed  -Monitor for any acute mental status changes  - once a week labs

## 2020-07-09 PROCEDURE — 99232 SBSQ HOSP IP/OBS MODERATE 35: CPT

## 2020-07-09 RX ADMIN — HEPARIN SODIUM 5000 UNIT(S): 5000 INJECTION INTRAVENOUS; SUBCUTANEOUS at 05:52

## 2020-07-09 RX ADMIN — Medication 6 MILLIGRAM(S): at 21:44

## 2020-07-09 RX ADMIN — HEPARIN SODIUM 5000 UNIT(S): 5000 INJECTION INTRAVENOUS; SUBCUTANEOUS at 17:03

## 2020-07-09 NOTE — PROGRESS NOTE ADULT - SUBJECTIVE AND OBJECTIVE BOX
Georgiana Brand PGY1    Patient is a 74y old  Male who presents with a chief complaint of Dementia, unspecified type (08 Jul 2020 11:50)      SUBJECTIVE / OVERNIGHT EVENTS:    MEDICATIONS  (STANDING):  heparin   Injectable 5000 Unit(s) SubCutaneous every 12 hours  melatonin 6 milliGRAM(s) Oral at bedtime    MEDICATIONS  (PRN):      CAPILLARY BLOOD GLUCOSE        I&O's Summary      Vital Signs Last 24 Hrs  T(C): 37 (09 Jul 2020 05:52), Max: 37 (09 Jul 2020 05:52)  T(F): 98.6 (09 Jul 2020 05:52), Max: 98.6 (09 Jul 2020 05:52)  HR: 70 (09 Jul 2020 05:52) (55 - 70)  BP: 108/58 (09 Jul 2020 05:52) (108/58 - 116/57)  BP(mean): --  RR: 17 (09 Jul 2020 05:52) (16 - 17)  SpO2: 99% (09 Jul 2020 05:52) (95% - 99%)    PHYSICAL EXAM:  GENERAL: NAD, well-developed, well-nourished  HEAD: Atraumatic, Normocephalic  EYES: EOMI, PERRLA, conjunctiva and sclera clear  NECK: Supple, No JVD  CHEST/LUNG: Clear to auscultation bilaterally; No wheezes or crackles  HEART: Normal S1/S2; Regular rate and rhythm; No murmurs, rubs, or gallops  ABDOMEN: Soft, Nontender, Nondistended; Bowel sounds present  EXTREMITIES: 2+ Peripheral Pulses; No clubbing, cyanosis, or edema  PSYCH: A&Ox3  NEUROLOGY: no focal neurologic deficit  SKIN: No rashes or lesions    LABS:                     RADIOLOGY & ADDITIONAL TESTS:    Imaging Personally Reviewed:    Consultant(s) Notes Reviewed:      Care Discussed with Consultants/Other Providers: Georgiana Brand PGY1    Patient is a 74y old  Male who presents with a chief complaint of Dementia, unspecified type (08 Jul 2020 11:50)      SUBJECTIVE / OVERNIGHT EVENTS:  overnight: nothing  AM: eating food, says he is feeling fine     MEDICATIONS  (STANDING):  heparin   Injectable 5000 Unit(s) SubCutaneous every 12 hours  melatonin 6 milliGRAM(s) Oral at bedtime    MEDICATIONS  (PRN):      CAPILLARY BLOOD GLUCOSE        I&O's Summary      Vital Signs Last 24 Hrs  T(C): 37 (09 Jul 2020 05:52), Max: 37 (09 Jul 2020 05:52)  T(F): 98.6 (09 Jul 2020 05:52), Max: 98.6 (09 Jul 2020 05:52)  HR: 70 (09 Jul 2020 05:52) (55 - 70)  BP: 108/58 (09 Jul 2020 05:52) (108/58 - 116/57)  BP(mean): --  RR: 17 (09 Jul 2020 05:52) (16 - 17)  SpO2: 99% (09 Jul 2020 05:52) (95% - 99%)    PHYSICAL EXAM:  general: emancipated, lying down, appears comfortable  HEAD: Atraumatic, Normocephalic  EYES: EOMI, PERRLA, conjunctiva and sclera clear  NECK: Supple, No JVD  CHEST/LUNG: Clear to auscultation bilaterally; No wheezes or crackles  HEART: Normal S1/S2; Regular rate and rhythm; No murmurs, rubs, or gallops  ABDOMEN: Soft, Nontender, Nondistended; Bowel sounds present  EXTREMITIES: 2+ Peripheral Pulses; No clubbing, cyanosis, or edema  PSYCH: A&Ox3  NEUROLOGY: no focal neurologic deficit  SKIN: No rashes or lesions    LABS:                     RADIOLOGY & ADDITIONAL TESTS:    Imaging Personally Reviewed:    Consultant(s) Notes Reviewed:      Care Discussed with Consultants/Other Providers:

## 2020-07-10 PROCEDURE — 99232 SBSQ HOSP IP/OBS MODERATE 35: CPT

## 2020-07-10 RX ADMIN — HEPARIN SODIUM 5000 UNIT(S): 5000 INJECTION INTRAVENOUS; SUBCUTANEOUS at 05:27

## 2020-07-10 RX ADMIN — Medication 6 MILLIGRAM(S): at 23:12

## 2020-07-10 RX ADMIN — HEPARIN SODIUM 5000 UNIT(S): 5000 INJECTION INTRAVENOUS; SUBCUTANEOUS at 17:17

## 2020-07-10 NOTE — PROGRESS NOTE ADULT - SUBJECTIVE AND OBJECTIVE BOX
Medicine Progress Note    Patient is a 74y old  Male who presents with a chief complaint of Dementia, unspecified type (09 Jul 2020 10:53)      SUBJECTIVE / OVERNIGHT EVENTS:    ADDITIONAL REVIEW OF SYSTEMS:    MEDICATIONS  (STANDING):  heparin   Injectable 5000 Unit(s) SubCutaneous every 12 hours  melatonin 6 milliGRAM(s) Oral at bedtime    MEDICATIONS  (PRN):    CAPILLARY BLOOD GLUCOSE        I&O's Summary      PHYSICAL EXAM:  Vital Signs Last 24 Hrs  T(C): 36.5 (10 Jul 2020 05:26), Max: 37.2 (09 Jul 2020 21:36)  T(F): 97.7 (10 Jul 2020 05:26), Max: 98.9 (09 Jul 2020 21:36)  HR: 61 (10 Jul 2020 05:26) (59 - 65)  BP: 113/61 (10 Jul 2020 05:26) (113/61 - 125/60)  BP(mean): --  RR: 17 (10 Jul 2020 05:26) (16 - 17)  SpO2: 98% (10 Jul 2020 05:26) (98% - 100%)  CONSTITUTIONAL: NAD, well-developed, well-groomed  ENMT: Moist oral mucosa, no pharyngeal injection or exudates; normal dentition  RESPIRATORY: Normal respiratory effort; lungs are clear to auscultation bilaterally  CARDIOVASCULAR: Regular rate and rhythm, normal S1 and S2, no murmur/rub/gallop; No lower extremity edema; Peripheral pulses are 2+ bilaterally  ABDOMEN: Nontender to palpation, normoactive bowel sounds, no rebound/guarding; No hepatosplenomegaly  PSYCH: A+O to person, place, and time; affect appropriate  NEUROLOGY: CN 2-12 are intact and symmetric; no gross sensory deficits   SKIN: No rashes; no palpable lesions    LABS:                    COVID-19 PCR: NotDetec (26 Jun 2020 12:19)      RADIOLOGY & ADDITIONAL TESTS:  Imaging from Last 24 Hours:    Electrocardiogram/QTc Interval:    COORDINATION OF CARE:  Care Discussed with Consultants/Other Providers: Medicine Progress Note    Patient is a 74y old  Male who presents with a chief complaint of Dementia, unspecified type (09 Jul 2020 10:53)      SUBJECTIVE / OVERNIGHT EVENTS:  overnight: nothing  am: feeling fine, eating breakfast    ADDITIONAL REVIEW OF SYSTEMS:    MEDICATIONS  (STANDING):  heparin   Injectable 5000 Unit(s) SubCutaneous every 12 hours  melatonin 6 milliGRAM(s) Oral at bedtime    MEDICATIONS  (PRN):    CAPILLARY BLOOD GLUCOSE        I&O's Summary      PHYSICAL EXAM:  Vital Signs Last 24 Hrs  T(C): 36.5 (10 Jul 2020 05:26), Max: 37.2 (09 Jul 2020 21:36)  T(F): 97.7 (10 Jul 2020 05:26), Max: 98.9 (09 Jul 2020 21:36)  HR: 61 (10 Jul 2020 05:26) (59 - 65)  BP: 113/61 (10 Jul 2020 05:26) (113/61 - 125/60)  BP(mean): --  RR: 17 (10 Jul 2020 05:26) (16 - 17)  SpO2: 98% (10 Jul 2020 05:26) (98% - 100%)  CONSTITUTIONAL: NAD, well-developed, well-groomed  ENMT: Moist oral mucosa, no pharyngeal injection or exudates; normal dentition  RESPIRATORY: Normal respiratory effort; lungs are clear to auscultation bilaterally  CARDIOVASCULAR: Regular rate and rhythm, normal S1 and S2, no murmur/rub/gallop; No lower extremity edema; Peripheral pulses are 2+ bilaterally  ABDOMEN: Nontender to palpation, normoactive bowel sounds, no rebound/guarding; No hepatosplenomegaly  PSYCH: A+O to person, place, and time; affect appropriate  NEUROLOGY: CN 2-12 are intact and symmetric; no gross sensory deficits   SKIN: No rashes; no palpable lesions    LABS:                    COVID-19 PCR: NotDetec (26 Jun 2020 12:19)      RADIOLOGY & ADDITIONAL TESTS:  Imaging from Last 24 Hours:    Electrocardiogram/QTc Interval:    COORDINATION OF CARE:  Care Discussed with Consultants/Other Providers:

## 2020-07-10 NOTE — PROGRESS NOTE ADULT - PROBLEM SELECTOR PLAN 6
DVT prophylaxis: subq heparin Calcipotriene Counseling:  I discussed with the patient the risks of calcipotriene including but not limited to erythema, scaling, itching, and irritation.

## 2020-07-11 PROCEDURE — 99232 SBSQ HOSP IP/OBS MODERATE 35: CPT

## 2020-07-11 RX ADMIN — HEPARIN SODIUM 5000 UNIT(S): 5000 INJECTION INTRAVENOUS; SUBCUTANEOUS at 18:11

## 2020-07-11 RX ADMIN — HEPARIN SODIUM 5000 UNIT(S): 5000 INJECTION INTRAVENOUS; SUBCUTANEOUS at 07:23

## 2020-07-11 NOTE — PROGRESS NOTE ADULT - SUBJECTIVE AND OBJECTIVE BOX
MYRAPABLITOJOHN  74y  Male      Subjective:     No acute overnight events. THis am, patient denies f/c/n/v/d/CP/SOB.      Meds    MEDICATIONS  (STANDING):  heparin   Injectable 5000 Unit(s) SubCutaneous every 12 hours  melatonin 6 milliGRAM(s) Oral at bedtime    MEDICATIONS  (PRN):        Vital Signs Last 24 Hrs  T(C): 36.6 (11 Jul 2020 06:28), Max: 36.9 (10 Jul 2020 12:32)  T(F): 97.8 (11 Jul 2020 06:28), Max: 98.5 (10 Jul 2020 12:32)  HR: 56 (11 Jul 2020 06:28) (53 - 70)  BP: 113/58 (11 Jul 2020 06:28) (109/54 - 147/56)  BP(mean): --  RR: 17 (11 Jul 2020 06:28) (17 - 18)  SpO2: 98% (11 Jul 2020 06:28) (97% - 100%)    PHYSICAL EXAM:  GENERAL: NAD, well-groomed, well-developed  HEENT - NC/AT, pupils equal and reactive to light,  ; Moist mucous membranes, Good dentition, No lesions  NECK: Supple, No JVD  CHEST/LUNG: Clear to auscultation bilaterally; No rales, rhonchi, wheezing  HEART: Regular rate and rhythm; No murmurs, rubs, or gallops  ABDOMEN: Soft, Nontender, Nondistended; Bowel sounds present  EXTREMITIES:  2+ Peripheral Pulses, No clubbing, cyanosis, or edema  NEURO:  No Focal deficits, sensory and motor intact  SKIN: No rashes or lesions    Consultant(s) Notes Reviewed:  [x ] YES  [ ] NO  Care Discussed with Consultants/Other Providers [ x] YES  [ ] NO    LABS:      The Labs were reviewed by me   The Radiology was reviewed by me    EKG tracing reviewed by me                                    CAPILLARY BLOOD GLUCOSE              RADIOLOGY & ADDITIONAL TESTS:

## 2020-07-11 NOTE — PROGRESS NOTE ADULT - PROBLEM SELECTOR PLAN 1
-Attempting to find additional information re: history with social work. Police report filed  -Monitor for any acute mental status changes  - once a week labs

## 2020-07-12 PROCEDURE — 99232 SBSQ HOSP IP/OBS MODERATE 35: CPT

## 2020-07-12 RX ADMIN — Medication 6 MILLIGRAM(S): at 22:38

## 2020-07-12 RX ADMIN — HEPARIN SODIUM 5000 UNIT(S): 5000 INJECTION INTRAVENOUS; SUBCUTANEOUS at 18:17

## 2020-07-12 RX ADMIN — HEPARIN SODIUM 5000 UNIT(S): 5000 INJECTION INTRAVENOUS; SUBCUTANEOUS at 07:26

## 2020-07-12 NOTE — PROGRESS NOTE ADULT - PROBLEM SELECTOR PLAN 7
Dispo: pending finger scanner   Transitions of Care Status:  1.  Name of PCP: No PCP  2.  PCP Contacted on Admission: [ ] Y    x] N    3.  PCP contacted at Discharge: [ ] Y    [ ] N    [ ] N/A  4.  Post-Discharge Appointment Date and Location:  5.  Summary of Handoff given to PCP:

## 2020-07-12 NOTE — PROGRESS NOTE ADULT - SUBJECTIVE AND OBJECTIVE BOX
Georgiana Brand PGY1    Patient is a 74y old  Male who presents with a chief complaint of Dementia, unspecified type (11 Jul 2020 11:05)      SUBJECTIVE / OVERNIGHT EVENTS:    MEDICATIONS  (STANDING):  heparin   Injectable 5000 Unit(s) SubCutaneous every 12 hours  melatonin 6 milliGRAM(s) Oral at bedtime    MEDICATIONS  (PRN):      CAPILLARY BLOOD GLUCOSE        I&O's Summary      Vital Signs Last 24 Hrs  T(C): 36.9 (12 Jul 2020 06:31), Max: 36.9 (12 Jul 2020 06:31)  T(F): 98.4 (12 Jul 2020 06:31), Max: 98.4 (12 Jul 2020 06:31)  HR: 55 (12 Jul 2020 06:31) (55 - 63)  BP: 119/55 (12 Jul 2020 06:31) (115/57 - 120/64)  BP(mean): --  RR: 17 (12 Jul 2020 06:31) (17 - 18)  SpO2: 98% (12 Jul 2020 06:31) (97% - 100%)    PHYSICAL EXAM:  CONSTITUTIONAL: lying in bed, finished eating breakfast, looks comfortable  RESPIRATORY: Normal respiratory effort; lungs are clear to auscultation bilaterally  CARDIOVASCULAR: Regular rate and rhythm, normal S1 and S2, no murmur/rub/gallop; No lower extremity edema; Peripheral pulses are 2+ bilaterally  ABDOMEN: Nontender to palpation, normoactive bowel sounds, no rebound/guarding; No hepatosplenomegaly  PSYCH: A+O to person, place, and time; affect appropriate  NEUROLOGY: CN 2-12 are intact and symmetric; no gross sensory deficits   SKIN: No rashes; no palpable lesions    LABS:                     RADIOLOGY & ADDITIONAL TESTS:    Imaging Personally Reviewed:    Consultant(s) Notes Reviewed:      Care Discussed with Consultants/Other Providers: Georgiana Brand PGY1    Patient is a 74y old  Male who presents with a chief complaint of Dementia, unspecified type (11 Jul 2020 11:05)      SUBJECTIVE / OVERNIGHT EVENTS: No problems reported over night. Resting comfortably, no complaints.    MEDICATIONS  (STANDING):  heparin   Injectable 5000 Unit(s) SubCutaneous every 12 hours  melatonin 6 milliGRAM(s) Oral at bedtime    MEDICATIONS  (PRN):      CAPILLARY BLOOD GLUCOSE        I&O's Summary      Vital Signs Last 24 Hrs  T(C): 36.9 (12 Jul 2020 06:31), Max: 36.9 (12 Jul 2020 06:31)  T(F): 98.4 (12 Jul 2020 06:31), Max: 98.4 (12 Jul 2020 06:31)  HR: 55 (12 Jul 2020 06:31) (55 - 63)  BP: 119/55 (12 Jul 2020 06:31) (115/57 - 120/64)  BP(mean): --  RR: 17 (12 Jul 2020 06:31) (17 - 18)  SpO2: 98% (12 Jul 2020 06:31) (97% - 100%)    PHYSICAL EXAM:  CONSTITUTIONAL: lying in bed, finished eating breakfast, looks comfortable  RESPIRATORY: Normal respiratory effort; lungs are clear to auscultation bilaterally  CARDIOVASCULAR: Regular rate and rhythm, normal S1 and S2, no murmur/rub/gallop; No lower extremity edema; Peripheral pulses are 2+ bilaterally  ABDOMEN: Nontender to palpation, normoactive bowel sounds, no rebound/guarding; No hepatosplenomegaly  PSYCH: A+O to person, place, and time; affect appropriate  NEUROLOGY: CN 2-12 are intact and symmetric; no gross sensory deficits   SKIN: No rashes; no palpable lesions    LABS:                     RADIOLOGY & ADDITIONAL TESTS:    Imaging Personally Reviewed:    Consultant(s) Notes Reviewed:      Care Discussed with Consultants/Other Providers:

## 2020-07-12 NOTE — PROGRESS NOTE ADULT - ASSESSMENT
73 yo M who denies PMH, presenting [place] side [place] the [place] by [place], found to have dementia and unable to provide a full history awaiting dispo plans by SW. Labs 7/7 micah.

## 2020-07-13 PROCEDURE — 99232 SBSQ HOSP IP/OBS MODERATE 35: CPT

## 2020-07-13 RX ADMIN — HEPARIN SODIUM 5000 UNIT(S): 5000 INJECTION INTRAVENOUS; SUBCUTANEOUS at 17:10

## 2020-07-13 RX ADMIN — HEPARIN SODIUM 5000 UNIT(S): 5000 INJECTION INTRAVENOUS; SUBCUTANEOUS at 07:04

## 2020-07-13 RX ADMIN — Medication 6 MILLIGRAM(S): at 22:22

## 2020-07-13 NOTE — PROGRESS NOTE ADULT - PROBLEM SELECTOR PLAN 1
[facility] Progress Note    Patient: Del Bear Date of Service: 7/13/2017   YOB: 1948 Admission Date: 7/5/2017   MRN: [id number] Attending: Fredi Yates MD       ICU admit date: 7/9/17  Intubation date: 7/9/17    Summary: Del Bear is a 69 year old patient with PMH significant for HTN, HLD, HepC, COPD, ETOH and tobacco abuse who initially presented with abd pain on 7/5/17 CT abd/pel showing concern of peptic ulcer perf, taken emergently to OR and underwent exploratory laporatomy with omental repair/kaity patch for gastric ulcer perforation. Pt post op was doing well, noted to be walking around and feeling fine until 7/9/17, 14:31 when he became non responsive after talking with staff, pulses lost. Code called and, underwent CPR; shocked twice for VF arrest, one round epi, intubated and another shock for unstable narrow complex tachycardia. Transferred to ICU. Levophed started and bolused 3.5L fluid. Same day at 19:50 pt coded again, VF arrest shocked twice w/ return of pulses. Given one dose of epinephrine 1 mg and amiodarone 150 mg bolus and started on amiodarone gtt.     Subjective: No acute issues overnight. Continues to have diarrhea and C. Diff positive. Started on tube feeding.     Active problems:  -- V-fib cardiac arrest X 2  -- Hypotension, improved and off pressors  -- QT prolongation  -- Elevated troponin  -- Lactic acidosis  -- Hypokalemia  -- Hypomagnesemia, improved  -- S/p exploratory laparotomy and omental Kaity patch of perforated gastric ulcer, 7/6/17  -- Normocytic anemia, 2/2 blood loss      ASSESSMENT/PLAN:    Neuro: Patient awake and following some commands. Off sedatives. Had some jerky body movement yesterday and was given Fosphenytoin 500 mg once. EEG showed burst suppression pattern and left central parietal focal epileptiform discharges. Neuron specific enolase elevated at 18.  - Neuro checks q 2 hrs  - Repeat EEG completed today; await for  results  - Will obtain MRI when sutures removed  - Neurology following; appreciate recommendation     CV: Had v-fib cardiac arrest x 2. No rhythm prior to arrest. Likely due to arrhythmia given QT prolongation, Mg 1.1 and four doses of Fluconazole. Troponin peaked at 0.68, likely due to CPR/shock. EKG not suggestive of ACS and hence LHC was differed. Limited ECHO on 7/9/17 showed EF 40% and mild global left ventricular hypokinesis. Repeat ECHO on 7/13/17 showed EF 33 %.   - Mildly tachycardic, BP stable off pressor; remove Art. Line  - Check K and Mg BID and keep >4 and >2 respectively  - Daily EKGs  - Avoid QT prolonging medications  - Per cardiology, will start low dose BB as tolerated; ACEi indicated but patient allergic to lisinopril (angioedema)     Pulmonary: Extubated on 7/12/17; saturating well on RA.  - Continue to monitor     Renal: Creatinine low likely due to low body mass. K 3.2 and Mg 2.5 despite repeated replacements. UOP better and 1450 ml over the last 24 hrs with net -85 over 24 hrs but 10.5 L since admisison.   - Supplement and keep K>4 and Mg >2  - Monitor BMP q 12 hrs  - Monitor UOP; remove James and insert condom cath  - Lasix 20 mg IV daily    ID: Afebrile, WBC normal. Antibiotics stopped dur to no concern for infection. Has diarrhea and C. Diff positive.  - PO vancomycin 250 mg qid     Heme: Hgb stable and 9.0 and PLT also stable at138.  INR also started improving (1.6--2.4--2.6--2.0). Has oozing from line sites.   - Monitor CBC  - Repeat INR in AM  - Hold Lovenox for VTE prophylaxis     Endocrine: Blood glucose within ICU limits.    - Monitor BG closely     GI: S/p exploratory laparotomy and omental Bret patch of perforated gastric ulcer, 7/6/17. Has history of alcohol abuse with elevated LFTs, which improved. Started on tube feeding. C. Diff per ID section.    - Monitor LFTs  - Continue tube feeding; pending speech reevaluation     Goals/Disposition: Will remain in the  ICU    ================================================================      I/O last 3 completed shifts:  In: 1365 [I.V.:918; NG/GT:447]  Out: 1450 [Urine:1450]  No intake/output data recorded.    Vital Last Value 24 Hour Range   Temperature 98.8 °F (37.1 °C) (07/13/17 0800) Temp  Min: 98.4 °F (36.9 °C)  Max: 99.5 °F (37.5 °C)   Pulse 113 (07/13/17 0800) Pulse  Min: 105  Max: 130   Respiratory (!) 37 (07/13/17 0800) Resp  Min: 20  Max: 44   Non-Invasive  Blood Pressure 109/81 (07/13/17 0800) BP  Min: 99/74  Max: 110/76   Pulse Oximetry 95 % (07/13/17 0800) SpO2  Min: 95 %  Max: 100 %   Arterial   Blood Pressure 108/73 (07/13/17 0800) Arterial Line BP  Min: 94/57  Max: 115/76     VENT SETTINGS       LAB RESULTS    Recent Labs  Lab 07/13/17  0455 07/12/17  0441   WBC 8.8 10.1   HCT 25.3* 26.5*   HGB 9.0* 9.4*   * 138*       Recent Labs  Lab 07/13/17  0455 07/13/17  0000 07/12/17  1830   SODIUM 141  --  142   POTASSIUM 3.2* 3.5 3.4   CHLORIDE 103  --  104   CO2 32  --  33*   GLUCOSE 148*  --  111*   BUN 10  --  9   CREATININE 0.51*  --  0.53*       IMAGING  Ct Abdomen Pelvis W/ Oral And Iv Contrast    Result Date: 7/6/2017  Exam: CT of the abdomen pelvis with IV contrast. COMPARISON: 11/16/2015. HISTORY: Abdominal pain TECHNIQUE: After the uneventful administration of IV contrast, dosage not readily apparent, axial images are acquired from the lung bases through the pelvis. FINDINGS: There is bibasilar atelectasis. There is a large amount of intra-abdominal free air. The source of the air appears to be originating from the pylorus/proximal duodenum. There are several small foci of extraluminal air medially adjacent to the pylorus extending into the caryn hepatis. Free air continues inferiorly along the anterior abdominal wall on the right to the level of the umbilicus. There is no pneumatosis. Remaining large and small bowel loops are normal in caliber. Small bowel however demonstrates increased enhancement  throughout consistent with shock bowel given the relative collapse of the IVC. There is a moderate amount of free fluid along the lateral aspect of the liver, extending to the right lower quadrant and pelvis. Additional free fluid adjacent to the spleen extending into the left lower quadrant. There is decreased attenuation of liver consistent with fatty infiltration. Diminutive spleen, pancreas, gallbladder and right kidney are unremarkable. There is a prominent kidney in the left renal fossa. Ectopic kidney located in the pelvis on the left. No hydronephrosis. There is no abdominal or retroperitoneal lymphadenopathy. Appendix is normal. There is a large amount of stool within the rectum. No iliac chain or inguinal lymphadenopathy. Marked lytic and sclerotic changes in the left hemipelvis unchanged, consistent with patient's history of paget's disease. No additional osseous abnormalities.     IMPRESSION: 1. Large amount of intra-abdominal free air is described above. Origin of the free air appears to be originating from the pylorus/proximal duodenum. Findings are likely related to a perforated ulcer. 2. Moderate amount of ascites extending into the pelvis. 3. Increased uniform enhancement of the small bowel with relative collapse of the IVC. Findings are consistent with shock bowel. 4. Results were communicated to Dr. EUGENIO ALMARAZ on 7/6/2017 2:36 AM by AMANDA Portillo,D.O.     Xr Chest Ap Or Pa    Result Date: 7/10/2017  HISTORY: Ventilated patient EXAM: AP chest x-ray COMPARISON: July 9, 2017 FINDINGS: The patient's endotracheal tube, left neck central venous catheter, and nasogastric tube are in stable and satisfactory position. There is no focal consolidation or infiltrate in the lungs, but there is a trace amount of left-sided pleural fluid and basilar airspace disease. The heart size and pulmonary vasculature are at the upper limits of normal for size. There are small, subcentimeter nodular density is  appreciated in the bilateral mid lungs, possibly representing nipple shadows. There is no pneumothorax.     IMPRESSION: 1. Stable position of lines and catheters. 2. Trace airspace disease in the left lower lobe with a small amount of left pleural fluid. 3. There are nodular densities in the midlung bilaterally. These may represent nipple shadows. Correlation with nipple markers on subsequent chest x-rays is recommended.     Xr Chest Ap Or Pa    Result Date: 7/9/2017  EXAM: Portable AP chest DATE: 07/09/17 at 1908 COMPARISON: 07/09/17 at 1650 and earlier CLINICAL HISTORY: Respiratory failure FINDINGS: Cardiac leads superimpose the chest. A left IJ central venous catheter is stable.  The endotracheal tube and nasogastric tube are stable. Minimal mid and lower lung atelectasis is again stable. The lungs are otherwise clear. The heart size is stable.     IMPRESSION: 1. Stable support lines and tubes. 2. Stable minimal bilateral atelectasis.     Xr Chest Ap Or Pa    Result Date: 7/9/2017  EXAM: Portable AP chest DATE: 07/09/17 at 1650 COMPARISON: 07/09/17 at 1524 and earlier CLINICAL HISTORY: Respiratory failure FINDINGS: Cardiac leads superimpose the chest. A left IJ central venous catheter is now apparent ending in the superior vena cava. No pneumothorax is identified. The endotracheal tube and nasogastric tube are stable. Minimal mid and lower lung atelectasis is again noted. The lungs are otherwise clear. The heart size is stable.     IMPRESSION: 1. Uncomplicated placement of left IJ central venous catheter. 2. Stable ET and NG tube. 3. Stable minimal bilateral atelectasis.     Xr Chest Ap Or Pa     Result Date: 7/9/2017  EXAM:  XR CHEST AP OR PA HISTORY:  Check ETT and NG position COMPARISON:  7/6/2017 TECHNIQUE:  A single portable AP supine view of the chest was obtained.  FINDINGS:  The tip of the endotracheal tube is seen approximately 41 mm above the erick. Hyperinflated lung zones. The heart is not enlarged.  Aortic ectasia noted. The distal tip of the orogastric tube projects over the stomach. No sizable pleural effusion or pneumothorax. Patchy atelectasis in the midlung zones bilaterally now suspected.    Monitoring leads are seen superimposed over the chest.     IMPRESSION:  Tubes and lines as discussed above. Patchy suspected atelectasis in the midlung zones.     Xr Chest Ap Or Pa    Result Date: 7/6/2017  EXAM: XR CHEST AP OR PA INDICATION:  weakness, ams COMPARISON:  3/27/2017.     FINDINGS/IMPRESSION:  The heart size and central vasculature are stable in appearance. There is no evidence of dense focal consolidation or significant effusion.     Xr Abdomen Ap Kub    Result Date: 7/9/2017  EXAM: AP Abdomen DATE: 07/09/17 COMPARISON: CT 07/06/17 CLINICAL HISTORY: Subcutaneous air FINDINGS: Mild gaseous distention of multiple small bowel is noted measuring up to 2.0 cm in diameter. There is also mild gaseous distention colon. A nasogastric tube is present with decompressed. Midline skin staples are now apparent. No residual postoperative free air is noted. No subcutaneous air is identified. No pathologic calcifications are identified.     IMPRESSION: 1. Mild gaseous distention of small bowel and colon suggestive of adynamic ileus. 2. Minimal residual postoperative free air. 3. Nasogastric tube in stomach.     Us Lower Extremity Arteries Duplex Left    Result Date: 7/10/2017  US LOWER EXTREMITY ARTERIES DUPLEX LEFT 7/09/2017. COMPARISON: None.. CLINICAL INDICATION: No pulses in the left lower extremity.. TECHNIQUE: Static grayscale, color Doppler, and spectral waveform sonographic images of bilateral lower extremity arterial systems were obtained and reviewed. FINDINGS: Mild atherosclerotic calcification throughout the left lower extremity arterial system.  Normal triphasic waveforms are found throughout the left lower extremity arterial system. Peak systolic velocities are as follows, in cm/sec: LEFT LOWER EXTREMITY:  CT head showed no acute pathological changes. UDS is only positive for cannabinoids  Plan:  -HIV negative  -Attempting to find additional information re: history with social work. Police report filed  -Monitor for any acute mental status changes  - once a week labs CFA: 43 DFA: 64 Proximal SFA: 50 Mid SFA: 65 Distal SFA: 64 Popliteal: 43 Proximal ABRAHAM: 23 Proximal PTA: 50 Proximal peroneal: 33 The distal peroneal artery and the dorsalis pedis artery are not visualized.     IMPRESSION: 1. Mild atherosclerotic calcification throughout the left lower extremity arterial system. 2. No hemodynamically significant stenosis of the left lower extremity arterial system. No evidence of inflow disease.        PHYSICAL EXAM    General/neuro:  Awake, follows some commands; has some rigidity of upper extremitites  HEENT:  PERRL, no conjunctival pallor or scleral icterus  CV:  Tachycardic, no m/r/g, S1/S2 present  Resp:  CTAB, no wheezing/crackles/rhonchi   Abd:  Not distended, midline surgical scar with sutures in place; hypoactive bowel sounds with crepetus on the left side   Ext:  No B/L LE edema  Skin: Warm and dry    Best Practices  Sedation:                                                          N/A  Analgesia:                                                        N/A  SBT:                                                                            N/A  Head of Bed:                                                               30 degrees  DVT Proph.:                                                     SCDs (sequential compression devices)  Nutrition:                                                          none  Glycemic Control:                                 Intrinsic  Line Necessity:                                     central line L IJ, James, PIV  Ulcer proph:                                                     PPI (proton pump inhibitor)    Pertinent Reviewed:  Allergies, Medications, Labs, Imaging and Physician and Nursing Notes     Assessment and plan discussed with Dr. Fredi Yates, who agrees with the above. Please see addendum for additional information.     Damon Estrada MD  PGY-III Internal Medicine  Pager: [phone]  7/13/2017       I have seen and examined  this patient, agree with the notes from the resident. Patient improving, following cardiac arrest in ventricular fibrillation, cardiogenic shock, renal failure, all currently improving. Patient's neurological status improved, neurology is following up on recent results of EEG. Remaining hemodynamically stable, will target negative fluid balance for today, follow electrolytes closely. Recently diagnosed with C. difficile colitis, currently on oral vancomycin. Started on tube feeds yesterday.    Critical care time 40 minutes excluding procedures    Fredi Yates MD

## 2020-07-13 NOTE — PROGRESS NOTE ADULT - SUBJECTIVE AND OBJECTIVE BOX
Georgiana Brand PGY1    Patient is a 74y old  Male who presents with a chief complaint of Dementia, unspecified type (12 Jul 2020 10:50)      SUBJECTIVE / OVERNIGHT EVENTS:  overnight: HR in high 50s, observed no sx  AM: feeling well, no pain, no chest pain or shortness of breath    MEDICATIONS  (STANDING):  heparin   Injectable 5000 Unit(s) SubCutaneous every 12 hours  melatonin 6 milliGRAM(s) Oral at bedtime    MEDICATIONS  (PRN):      CAPILLARY BLOOD GLUCOSE        I&O's Summary      Vital Signs Last 24 Hrs  T(C): 36.5 (13 Jul 2020 12:35), Max: 36.8 (13 Jul 2020 07:03)  T(F): 97.7 (13 Jul 2020 12:35), Max: 98.3 (13 Jul 2020 07:03)  HR: 57 (13 Jul 2020 12:35) (56 - 59)  BP: 116/59 (13 Jul 2020 12:35) (110/54 - 117/57)  BP(mean): --  RR: 18 (13 Jul 2020 12:35) (17 - 18)  SpO2: 100% (13 Jul 2020 12:35) (98% - 100%)    PHYSICAL EXAM:  RESPIRATORY: Normal respiratory effort; lungs are clear to auscultation bilaterally  CARDIOVASCULAR: Regular rate and rhythm, normal S1 and S2, no murmur/rub/gallop; No lower extremity edema; Peripheral pulses are 2+ bilaterally  ABDOMEN: Nontender to palpation, normoactive bowel sounds, no rebound/guarding; No hepatosplenomegaly  PSYCH: A+O to person, place, and time; affect appropriate  NEUROLOGY: CN 2-12 are intact and symmetric; no gross sensory deficits   SKIN: No rashes; no palpable lesions  LABS:                     RADIOLOGY & ADDITIONAL TESTS:    Imaging Personally Reviewed:    Consultant(s) Notes Reviewed:      Care Discussed with Consultants/Other Providers:

## 2020-07-14 DIAGNOSIS — B85.1 PEDICULOSIS DUE TO PEDICULUS HUMANUS CORPORIS: ICD-10-CM

## 2020-07-14 LAB
ANION GAP SERPL CALC-SCNC: 10 MMO/L — SIGNIFICANT CHANGE UP (ref 7–14)
BUN SERPL-MCNC: 33 MG/DL — HIGH (ref 7–23)
CALCIUM SERPL-MCNC: 9.6 MG/DL — SIGNIFICANT CHANGE UP (ref 8.4–10.5)
CHLORIDE SERPL-SCNC: 102 MMOL/L — SIGNIFICANT CHANGE UP (ref 98–107)
CO2 SERPL-SCNC: 25 MMOL/L — SIGNIFICANT CHANGE UP (ref 22–31)
CREAT SERPL-MCNC: 0.79 MG/DL — SIGNIFICANT CHANGE UP (ref 0.5–1.3)
GLUCOSE SERPL-MCNC: 90 MG/DL — SIGNIFICANT CHANGE UP (ref 70–99)
HCT VFR BLD CALC: 35.9 % — LOW (ref 39–50)
HGB BLD-MCNC: 11.5 G/DL — LOW (ref 13–17)
MAGNESIUM SERPL-MCNC: 2 MG/DL — SIGNIFICANT CHANGE UP (ref 1.6–2.6)
MCHC RBC-ENTMCNC: 31.1 PG — SIGNIFICANT CHANGE UP (ref 27–34)
MCHC RBC-ENTMCNC: 32 % — SIGNIFICANT CHANGE UP (ref 32–36)
MCV RBC AUTO: 97 FL — SIGNIFICANT CHANGE UP (ref 80–100)
NRBC # FLD: 0 K/UL — SIGNIFICANT CHANGE UP (ref 0–0)
PHOSPHATE SERPL-MCNC: 4 MG/DL — SIGNIFICANT CHANGE UP (ref 2.5–4.5)
PLATELET # BLD AUTO: 209 K/UL — SIGNIFICANT CHANGE UP (ref 150–400)
PMV BLD: 10.1 FL — SIGNIFICANT CHANGE UP (ref 7–13)
POTASSIUM SERPL-MCNC: 4.7 MMOL/L — SIGNIFICANT CHANGE UP (ref 3.5–5.3)
POTASSIUM SERPL-SCNC: 4.7 MMOL/L — SIGNIFICANT CHANGE UP (ref 3.5–5.3)
RBC # BLD: 3.7 M/UL — LOW (ref 4.2–5.8)
RBC # FLD: 13.4 % — SIGNIFICANT CHANGE UP (ref 10.3–14.5)
SODIUM SERPL-SCNC: 137 MMOL/L — SIGNIFICANT CHANGE UP (ref 135–145)
WBC # BLD: 5.61 K/UL — SIGNIFICANT CHANGE UP (ref 3.8–10.5)
WBC # FLD AUTO: 5.61 K/UL — SIGNIFICANT CHANGE UP (ref 3.8–10.5)

## 2020-07-14 PROCEDURE — 99232 SBSQ HOSP IP/OBS MODERATE 35: CPT

## 2020-07-14 PROCEDURE — 99223 1ST HOSP IP/OBS HIGH 75: CPT

## 2020-07-14 RX ORDER — HALOPERIDOL DECANOATE 100 MG/ML
0.5 INJECTION INTRAMUSCULAR ONCE
Refills: 0 | Status: COMPLETED | OUTPATIENT
Start: 2020-07-14 | End: 2020-07-14

## 2020-07-14 RX ORDER — PERMETHRIN CREAM 5% W/W 50 MG/G
1 CREAM TOPICAL ONCE
Refills: 0 | Status: COMPLETED | OUTPATIENT
Start: 2020-07-14 | End: 2020-07-14

## 2020-07-14 RX ADMIN — PERMETHRIN CREAM 5% W/W 1 APPLICATION(S): 50 CREAM TOPICAL at 15:35

## 2020-07-14 RX ADMIN — HEPARIN SODIUM 5000 UNIT(S): 5000 INJECTION INTRAVENOUS; SUBCUTANEOUS at 05:49

## 2020-07-14 RX ADMIN — Medication 6 MILLIGRAM(S): at 22:10

## 2020-07-14 RX ADMIN — HALOPERIDOL DECANOATE 0.5 MILLIGRAM(S): 100 INJECTION INTRAMUSCULAR at 16:02

## 2020-07-14 NOTE — CONSULT NOTE ADULT - ASSESSMENT
1. Pediculosis corporis   Consistent with location of specimens in seam of clothes and morphology of intact louse.  No evidence of active dermatitis.  - completely replace patient's home clothing (including undergarments) until cleaned with hot water/high heat   - start permethrin 5% cream from head to toe at nighttime, to be repeated in 1 week    The patient's chart was reviewed in addition to being seen and examined at bedside with the dermatology attending Dr. Melvi Wlater.  Recommendations were communicated with the primary team.  Please page [phone] for further related questions.    Joe Reynolds MD  Resident Physician, PGY3  [facility]  Pager: [phone]  Office: [phone]

## 2020-07-14 NOTE — CONSULT NOTE ADULT - SUBJECTIVE AND OBJECTIVE BOX
HPI:  73 yo M who denies PMH who [place].  Dermatology consulted for bugs on the body, noted on admission and originally in the seam of patient's clothes, asymptomatic, replaced patient's clothes for a hospital gown and treated with topical permethrin but additional bugs noted today.    PAST MEDICAL & SURGICAL HISTORY:  No pertinent past medical history  No significant past surgical history      REVIEW OF SYSTEMS      General: no fevers/chills, no lethargy	    Skin/Breast: see HPI  	  Ophthalmologic: no eye pain or change in vision  	  ENMT: no dysphagia or change in hearing    Respiratory and Thorax: no SOB or cough  	  Cardiovascular: no palpitations or chest pain    Gastrointestinal: no abdominal pain or blood in stool     Genitourinary: no dysuria or frequency    Musculoskeletal: no joint pains or weakness	    Neurological: no weakness, numbness, or tingling    MEDICATIONS  (STANDING):  heparin   Injectable 5000 Unit(s) SubCutaneous every 12 hours  melatonin 6 milliGRAM(s) Oral at bedtime    MEDICATIONS  (PRN):      Allergies    No Known Allergies    Intolerances        SOCIAL HISTORY:    FAMILY HISTORY:  No pertinent family history in first degree relatives      Vital Signs Last 24 Hrs  T(C): 36.6 (14 Jul 2020 13:11), Max: 37 (14 Jul 2020 05:46)  T(F): 97.9 (14 Jul 2020 13:11), Max: 98.6 (14 Jul 2020 05:46)  HR: 64 (14 Jul 2020 13:11) (55 - 68)  BP: 137/62 (14 Jul 2020 13:11) (110/43 - 137/62)  BP(mean): --  RR: 18 (14 Jul 2020 13:11) (17 - 18)  SpO2: 100% (14 Jul 2020 13:11) (97% - 100%)    PHYSICAL EXAM:     The patient was alert and oriented to self, well nourished, and in no apparent distress.  OP showed no ulcerations  There was no visible lymphadenopathy.  Conjunctiva were non injected  There was no clubbing or edema of extremities.  The scalp, hair, face, eyebrows, lips, OP, neck, chest, back,   extremities X 4, nails were examined.  There was no hyperhidrosis or bromhidrosis.    Of note on skin exam:   - no appreciable evidence of an active dermatitis or excoriations  - patient wearing home undergarments  - intact louse specimen in plastic bag    LABS:                        11.5   5.61  )-----------( 209      ( 14 Jul 2020 07:00 )             35.9     07-14    137  |  102  |  33<H>  ----------------------------<  90  4.7   |  25  |  0.79    Ca    9.6      14 Jul 2020 07:00  Phos  4.0     07-14  Mg     2.0     07-14

## 2020-07-14 NOTE — PROGRESS NOTE ADULT - PROBLEM SELECTOR PLAN 2
Will evaluate specimen when placed at bedside  - lice again visualized 7/14 - will attempt to collect for derm eval   -S/p permethrin  -Derm consult if new insects found

## 2020-07-14 NOTE — PROGRESS NOTE ADULT - ASSESSMENT
75 yo M who denies PMH, [place], found to have dementia and unable to provide a full history awaiting dispo plans by SW. Malae visualized 7/14. Labs 7/14 stable.

## 2020-07-14 NOTE — PROGRESS NOTE ADULT - SUBJECTIVE AND OBJECTIVE BOX
Georgiana Cathie PGY1    Patient is a 74y old  Male who presents with a chief complaint of Dementia, unspecified type (13 Jul 2020 15:52)      SUBJECTIVE / OVERNIGHT EVENTS:  overnight: low BP  AM: pt in no distress, feeling well. One light brown body lice visualized on pillow when asked pt to sit up for lung exam.     MEDICATIONS  (STANDING):  heparin   Injectable 5000 Unit(s) SubCutaneous every 12 hours  melatonin 6 milliGRAM(s) Oral at bedtime  permethrin 5% Cream 1 Application(s) Topical once    MEDICATIONS  (PRN):      CAPILLARY BLOOD GLUCOSE        I&O's Summary      Vital Signs Last 24 Hrs  T(C): 36.6 (14 Jul 2020 13:11), Max: 37 (14 Jul 2020 05:46)  T(F): 97.9 (14 Jul 2020 13:11), Max: 98.6 (14 Jul 2020 05:46)  HR: 64 (14 Jul 2020 13:11) (55 - 68)  BP: 137/62 (14 Jul 2020 13:11) (110/43 - 137/62)  BP(mean): --  RR: 18 (14 Jul 2020 13:11) (17 - 18)  SpO2: 100% (14 Jul 2020 13:11) (97% - 100%)    PHYSICAL EXAM:  GENERAL: NAD, thin,  well-nourished  HEAD: Atraumatic, Normocephalic  EYES: EOMI, PERRLA, conjunctiva and sclera clear  NECK: Supple, No JVD  CHEST/LUNG: Clear to auscultation bilaterally; No wheezes or crackles  HEART: Normal S1/S2; Regular rate and rhythm; No murmurs, rubs, or gallops  ABDOMEN: Soft, Nontender, Nondistended; Bowel sounds present  EXTREMITIES: 2+ Peripheral Pulses; No clubbing, cyanosis, or edema  PSYCH: A&Ox3  NEUROLOGY: no focal neurologic deficit  SKIN: No rashes or lesions    LABS:                        11.5   5.61  )-----------( 209      ( 14 Jul 2020 07:00 )             35.9      07-14    137  |  102  |  33<H>  ----------------------------<  90  4.7   |  25  |  0.79    Ca    9.6      14 Jul 2020 07:00  Phos  4.0     07-14  Mg     2.0     07-14                RADIOLOGY & ADDITIONAL TESTS:    Imaging Personally Reviewed:    Consultant(s) Notes Reviewed:      Care Discussed with Consultants/Other Providers:

## 2020-07-15 PROCEDURE — 99232 SBSQ HOSP IP/OBS MODERATE 35: CPT

## 2020-07-15 RX ADMIN — Medication 6 MILLIGRAM(S): at 21:19

## 2020-07-15 RX ADMIN — HEPARIN SODIUM 5000 UNIT(S): 5000 INJECTION INTRAVENOUS; SUBCUTANEOUS at 16:55

## 2020-07-15 RX ADMIN — HEPARIN SODIUM 5000 UNIT(S): 5000 INJECTION INTRAVENOUS; SUBCUTANEOUS at 05:57

## 2020-07-15 NOTE — PROGRESS NOTE ADULT - PROBLEM SELECTOR PLAN 2
Will evaluate specimen when placed at bedside  - lice again visualized 7/14, tx permethrin per derm. Remove all clothes he came in with and change bedding. Contact precautions.    -Derm consult if new insects found

## 2020-07-15 NOTE — PROGRESS NOTE ADULT - SUBJECTIVE AND OBJECTIVE BOX
Georgiana Brand PGY1    Patient is a 74y old  Male who presents with a chief complaint of Dementia, unspecified type (14 Jul 2020 16:19)      SUBJECTIVE / OVERNIGHT EVENTS:    MEDICATIONS  (STANDING):  heparin   Injectable 5000 Unit(s) SubCutaneous every 12 hours  melatonin 6 milliGRAM(s) Oral at bedtime    MEDICATIONS  (PRN):      CAPILLARY BLOOD GLUCOSE        I&O's Summary      Vital Signs Last 24 Hrs  T(C): 36.6 (15 Jul 2020 12:33), Max: 36.7 (15 Jul 2020 05:56)  T(F): 97.9 (15 Jul 2020 12:33), Max: 98.1 (15 Jul 2020 05:56)  HR: 60 (15 Jul 2020 12:33) (58 - 64)  BP: 109/56 (15 Jul 2020 12:33) (109/56 - 139/64)  BP(mean): --  RR: 18 (15 Jul 2020 12:33) (17 - 18)  SpO2: 99% (15 Jul 2020 12:33) (98% - 99%)    PHYSICAL EXAM:  GENERAL: NAD, well-developed, well-nourished  HEAD: Atraumatic, Normocephalic  EYES: EOMI, PERRLA, conjunctiva and sclera clear  NECK: Supple, No JVD  CHEST/LUNG: Clear to auscultation bilaterally; No wheezes or crackles  HEART: Normal S1/S2; Regular rate and rhythm; No murmurs, rubs, or gallops  ABDOMEN: Soft, Nontender, Nondistended; Bowel sounds present  EXTREMITIES: 2+ Peripheral Pulses; No clubbing, cyanosis, or edema  PSYCH: A&Ox3  NEUROLOGY: no focal neurologic deficit  SKIN: No rashes or lesions    LABS:                        11.5   5.61  )-----------( 209      ( 14 Jul 2020 07:00 )             35.9      07-14    137  |  102  |  33<H>  ----------------------------<  90  4.7   |  25  |  0.79    Ca    9.6      14 Jul 2020 07:00  Phos  4.0     07-14  Mg     2.0     07-14                RADIOLOGY & ADDITIONAL TESTS:    Imaging Personally Reviewed:    Consultant(s) Notes Reviewed:      Care Discussed with Consultants/Other Providers: Georgiana Brand PGY1    Patient is a 74y old  Male who presents with a chief complaint of Dementia, unspecified type (14 Jul 2020 16:19)      SUBJECTIVE / OVERNIGHT EVENTS:  overnight - no events  am - lying down, feeling fine    MEDICATIONS  (STANDING):  heparin   Injectable 5000 Unit(s) SubCutaneous every 12 hours  melatonin 6 milliGRAM(s) Oral at bedtime    MEDICATIONS  (PRN):      CAPILLARY BLOOD GLUCOSE        I&O's Summary      Vital Signs Last 24 Hrs  T(C): 36.6 (15 Jul 2020 12:33), Max: 36.7 (15 Jul 2020 05:56)  T(F): 97.9 (15 Jul 2020 12:33), Max: 98.1 (15 Jul 2020 05:56)  HR: 60 (15 Jul 2020 12:33) (58 - 64)  BP: 109/56 (15 Jul 2020 12:33) (109/56 - 139/64)  BP(mean): --  RR: 18 (15 Jul 2020 12:33) (17 - 18)  SpO2: 99% (15 Jul 2020 12:33) (98% - 99%)    PHYSICAL EXAM:  GENERAL: thin, lying down, no distress  HEAD: Atraumatic, Normocephalic  EYES: EOMI, PERRLA, conjunctiva and sclera clear  NECK: Supple, No JVD  CHEST/LUNG: Clear to auscultation bilaterally; No wheezes or crackles  HEART: Normal S1/S2; Regular rate and rhythm; No murmurs, rubs, or gallops  ABDOMEN: Soft, Nontender, Nondistended; Bowel sounds present  EXTREMITIES: 2+ Peripheral Pulses; No clubbing, cyanosis, or edema  PSYCH: A&Ox3  NEUROLOGY: no focal neurologic deficit  SKIN: No rashes or lesions    LABS:                        11.5   5.61  )-----------( 209      ( 14 Jul 2020 07:00 )             35.9      07-14    137  |  102  |  33<H>  ----------------------------<  90  4.7   |  25  |  0.79    Ca    9.6      14 Jul 2020 07:00  Phos  4.0     07-14  Mg     2.0     07-14                RADIOLOGY & ADDITIONAL TESTS:    Imaging Personally Reviewed:    Consultant(s) Notes Reviewed:      Care Discussed with Consultants/Other Providers:

## 2020-07-16 PROCEDURE — 99232 SBSQ HOSP IP/OBS MODERATE 35: CPT

## 2020-07-16 RX ADMIN — HEPARIN SODIUM 5000 UNIT(S): 5000 INJECTION INTRAVENOUS; SUBCUTANEOUS at 06:37

## 2020-07-16 RX ADMIN — HEPARIN SODIUM 5000 UNIT(S): 5000 INJECTION INTRAVENOUS; SUBCUTANEOUS at 18:14

## 2020-07-16 NOTE — PROGRESS NOTE ADULT - SUBJECTIVE AND OBJECTIVE BOX
Georgiana Brand PGY1    Patient is a 74y old  Male who presents with a chief complaint of Dementia, unspecified type (15 Jul 2020 13:50)      SUBJECTIVE / OVERNIGHT EVENTS:  no events overnight  am - pt feels well and denies pain. not seen scratching (has lice)    MEDICATIONS  (STANDING):  heparin   Injectable 5000 Unit(s) SubCutaneous every 12 hours  melatonin 6 milliGRAM(s) Oral at bedtime    MEDICATIONS  (PRN):      CAPILLARY BLOOD GLUCOSE        I&O's Summary      Vital Signs Last 24 Hrs  T(C): 36.7 (16 Jul 2020 06:35), Max: 37.1 (15 Jul 2020 21:17)  T(F): 98 (16 Jul 2020 06:35), Max: 98.8 (15 Jul 2020 21:17)  HR: 68 (16 Jul 2020 06:35) (60 - 68)  BP: 118/52 (16 Jul 2020 06:35) (109/56 - 118/52)  BP(mean): --  RR: 16 (16 Jul 2020 06:35) (16 - 18)  SpO2: 98% (16 Jul 2020 06:35) (98% - 99%)    PHYSICAL EXAM:  GENERAL: NAD, thin,  well-nourished  HEAD: Atraumatic, Normocephalic  EYES: EOMI, PERRLA, conjunctiva and sclera clear  NECK: Supple, No JVD  CHEST/LUNG: Clear to auscultation bilaterally; No wheezes or crackles  HEART: Normal S1/S2; Regular rate and rhythm; No murmurs, rubs, or gallops  ABDOMEN: Soft, Nontender, Nondistended; Bowel sounds present  EXTREMITIES: 2+ Peripheral Pulses; No clubbing, cyanosis, or edema  PSYCH: A&Ox3  NEUROLOGY: no focal neurologic deficit  SKIN: No rashes or lesions        LABS:                     RADIOLOGY & ADDITIONAL TESTS:    Imaging Personally Reviewed:    Consultant(s) Notes Reviewed:      Care Discussed with Consultants/Other Providers:

## 2020-07-17 PROCEDURE — 99232 SBSQ HOSP IP/OBS MODERATE 35: CPT

## 2020-07-17 RX ADMIN — HEPARIN SODIUM 5000 UNIT(S): 5000 INJECTION INTRAVENOUS; SUBCUTANEOUS at 06:35

## 2020-07-17 RX ADMIN — HEPARIN SODIUM 5000 UNIT(S): 5000 INJECTION INTRAVENOUS; SUBCUTANEOUS at 17:27

## 2020-07-17 RX ADMIN — Medication 6 MILLIGRAM(S): at 21:37

## 2020-07-17 NOTE — CHART NOTE - NSCHARTNOTEFT_GEN_A_CORE
73 yo M who denies PMH, presenting [place], found to have dementia and unable to provide a full history awaiting dispo plans by SW. Labs 7/7 stable. Patient with good appetite/PO intake. RD observed patient consume 100% of Lunch and Ensure shake this afternoon. Patient denies any nausea/vomiting/diarrhea/constipation or difficulty chewing and swallowing. (6/26) 46kg. (7/7) 44.7kg. No new weights this admission.    Pertinent Medications: MEDICATIONS  (STANDING):  heparin   Injectable 5000 Unit(s) SubCutaneous every 12 hours  melatonin 6 milliGRAM(s) Oral at bedtime      Skin: No pressure injuries    Estimated Needs:   [X] no change since previous assessment  [] recalculated:       Previous Nutrition Diagnosis: [X] Malnutrition          Nutrition Diagnosis is [X] ongoing  [ ] resolved [ ] not applicable          New Nutrition Diagnosis: [X] not applicable      Interventions:     Recommend    [X] Continue with current diet order and nutrition supplementation  [X] Obtain weekly weights     Monitoring and Evaluation:     [X] PO intake [X] Tolerance to diet prescription [X] weights [X] follow up per protocol
75 yo M who denies PMH, presenting [place] side [place] the [place] by EMS, found to have dementia and unable to provide a full history awaiting dispo plans by SW. Malae visualized 7/14. Labs 7/14 stable. Spoke with RN who provided collateral. Per RN patient tolerating PO with good appetite/PO intake consuming 100% of meals and oral nutrition supplements (Ensure Enlive 240mls 3x daily (1050kcal, 60g protein). No nausea/vomiting/diarrhea/constipation or difficulty chewing and swallowing.  (6/26) 46kg. (7/7) 44.7kg. No new weights this admission.    Pertinent Medications: MEDICATIONS  (STANDING):  heparin   Injectable 5000 Unit(s) SubCutaneous every 12 hours  melatonin 6 milliGRAM(s) Oral at bedtime      Pertinent Labs:   07-14 Phos 4.0 mg/dL      Skin: No edema; skin wnl (per flowsheets)     Estimated Needs:   [X] no change since previous assessment  [] recalculated:       Previous Nutrition Diagnosis: [X] Malnutrition          Nutrition Diagnosis is [X] ongoing  [ ] resolved [ ] not applicable          New Nutrition Diagnosis: [X] not applicable      Interventions:     Recommend    [X] Continue with current diet order and nutrition supplementation  [X] Obtain weekly weights     Monitoring and Evaluation:     [X] PO intake [X] Tolerance to diet prescription [X] weights [X] follow up per protocol.
NF Resident was notified regarding hypotension 101/51.     Pt. was seen at bedside and found to be comfort and sleeping. No apparent distress was noted. Focused physical exam revealed clear lungs to ascultation bilaterally at 3 levels posteriorly and heart sounds were normal; S1/S2 without additional sounds; normal rate and rhythm, no murmurs rubs or gallops.    Decision was made to provide 500cc bolus of LR.     NF Resident instructed nursing to contact him if there is an acute change of status or a BP of less than 90SBP or 55DBP.
NUTRITION FOLLOW-UP:  Pt. observed with 100% consumption of breakfast & Ensure Enlive supplement. Noted with consistently good appetite/PO intake.  Pt. denies nausea/vomiting/diarrhea/constipation, or issues with chewing/swallowing @ this time.  Possible significant 2.8% weight decrease since admission.  Pt. continues to appear emaciated, cachectic with severe muscle wasting and severe subcutaneous fat loss.        Weight:  44.7kg 97/7/2020 obtained via bed scale)  46.0kg (6/26)      Edema:  None noted.    Skin:  No noted pressure injuries.        Pertinent Medications: MEDICATIONS  (STANDING):  heparin   Injectable 5000 Unit(s) SubCutaneous every 12 hours  melatonin 6 milliGRAM(s) Oral at bedtime    MEDICATIONS  (PRN):    Pertinent Labs:  07-07 Na139 mmol/L Glu 83 mg/dL K+ 4.6 mmol/L Cr  0.83 mg/dL BUN 34 mg/dL<H>          Diet, Regular:   Supplement Feeding Modality:  Oral  Ensure Enlive Cans or Servings Per Day:  1       Frequency:  Three Times a day (06-26-20 @ 17:14)      NUTRITION Dx:  Pt. remains severely malnourished       PLAN/RECOMMENDATIONS:    1) Continue current diet with Ensure Enlive supplement.   2) Obtain weekly weights      RDN remains available and will f/u PRN.          Salena Rust RDN, CDN pager [phone]
NUTRITION SERVICES     Upon Nutritional Assessment by the Registered Dietitian your patient was determined to meet criteria/ has evidence of the following diagnosis/diagnoses:  [x] Severe Protein Calorie Malnutrition     [x] Underweight / BMI <19    Findings as based on:  •  Comprehensive nutritional assessment and consultation    Please refer to Initial Dietitian Evaluation or Nutrition Follow-up via documents section of [employer] EMR for further recommendations.
Source: Patient assessed by RD on 6/27, now for malnutrition nutrition follow up. Obtained information from pt utilizing  services and spoke with RN. Obtained subjective information from extensive chart review.     Current Diet : Diet, Regular:   Supplement Feeding Modality:  Oral  Ensure Enlive Cans or Servings Per Day:  1       Frequency:  Three Times a day (06-26-20 @ 17:14)    PO intake: 100%  Current Weight: Current weight unavailable; admit wt 46 kg    Nutrition Interval Events: Pt said that he is eating well and likes Ensure shakes. Additional food preferences taken. RN confirmed that pt has a very good oral intake. No GI distress noted. Request obtaining new weight to assess trend and determine adequacy of PO intake. Continue with oral supplementation for additional calories and protein. Pt's physical presentation exhibiting severe subcutaneous fat store depletion and severe muscle mass wasting continue to present pt at severe risk for malnutrition until a current weight is obtained. RDN services to remain available as needed.     __________________ Pertinent Medications__________________   MEDICATIONS  (STANDING):  heparin   Injectable 5000 Unit(s) SubCutaneous every 12 hours  melatonin 6 milliGRAM(s) Oral at bedtime    MEDICATIONS  (PRN):      __________________ Pertinent Labs__________________    06-27 Phos 2.6 mg/dL    No edema noted  No skin injuries noted       Estimated Needs:   [x ] no change since previous assessment      Nutrition Diagnosis: Severe malnutrition  [x] ongoing      Goal(s):  1. Patient to meet > 75% estimated energy needs    Recommendations:   1. Continue Ensure Enlive 240mls 3x daily (1050kcal, 60g protein)    Monitoring and Evaluation:   1. Monitor weights, labs, BMs, skin integrity, PO intake and edema.  2. RD services to remain available. Request obtaining new weight to assess trend and determine adequacy of PO intake. Continue with oral supplementation for additional calories and protein.

## 2020-07-17 NOTE — PROGRESS NOTE ADULT - SUBJECTIVE AND OBJECTIVE BOX
Georgiana Brand PGY1    Patient is a 74y old  Male who presents with a chief complaint of Dementia, unspecified type (16 Jul 2020 11:48)      SUBJECTIVE / OVERNIGHT EVENTS:  overnight - no events  - am -     MEDICATIONS  (STANDING):  heparin   Injectable 5000 Unit(s) SubCutaneous every 12 hours  melatonin 6 milliGRAM(s) Oral at bedtime    MEDICATIONS  (PRN):      CAPILLARY BLOOD GLUCOSE        I&O's Summary      Vital Signs Last 24 Hrs  T(C): 37 (17 Jul 2020 06:34), Max: 37.2 (16 Jul 2020 12:24)  T(F): 98.6 (17 Jul 2020 06:34), Max: 99 (16 Jul 2020 12:24)  HR: 60 (17 Jul 2020 06:34) (60 - 66)  BP: 113/57 (17 Jul 2020 06:34) (105/51 - 114/55)  BP(mean): --  RR: 17 (17 Jul 2020 06:34) (17 - 18)  SpO2: 99% (17 Jul 2020 06:34) (99% - 100%)    PHYSICAL EXAM:  GENERAL: NAD, well-developed, well-nourished  HEAD: Atraumatic, Normocephalic  EYES: EOMI, PERRLA, conjunctiva and sclera clear  NECK: Supple, No JVD  CHEST/LUNG: Clear to auscultation bilaterally; No wheezes or crackles  HEART: Normal S1/S2; Regular rate and rhythm; No murmurs, rubs, or gallops  ABDOMEN: Soft, Nontender, Nondistended; Bowel sounds present  EXTREMITIES: 2+ Peripheral Pulses; No clubbing, cyanosis, or edema  PSYCH: A&Ox3  NEUROLOGY: no focal neurologic deficit  SKIN: No rashes or lesions    LABS:                     RADIOLOGY & ADDITIONAL TESTS:    Imaging Personally Reviewed:    Consultant(s) Notes Reviewed:      Care Discussed with Consultants/Other Providers: Georgiana Brand PGY1    Patient is a 74y old  Male who presents with a chief complaint of Dementia, unspecified type (16 Jul 2020 11:48)      SUBJECTIVE / OVERNIGHT EVENTS:  overnight - no events  - am - feels well, no complaints    MEDICATIONS  (STANDING):  heparin   Injectable 5000 Unit(s) SubCutaneous every 12 hours  melatonin 6 milliGRAM(s) Oral at bedtime    MEDICATIONS  (PRN):      CAPILLARY BLOOD GLUCOSE        I&O's Summary      Vital Signs Last 24 Hrs  T(C): 37 (17 Jul 2020 06:34), Max: 37.2 (16 Jul 2020 12:24)  T(F): 98.6 (17 Jul 2020 06:34), Max: 99 (16 Jul 2020 12:24)  HR: 60 (17 Jul 2020 06:34) (60 - 66)  BP: 113/57 (17 Jul 2020 06:34) (105/51 - 114/55)  BP(mean): --  RR: 17 (17 Jul 2020 06:34) (17 - 18)  SpO2: 99% (17 Jul 2020 06:34) (99% - 100%)    PHYSICAL EXAM:  GENERAL: NAD, thin,  well-nourished  HEAD: Atraumatic, Normocephalic  EYES: EOMI, PERRLA, conjunctiva and sclera clear  NECK: Supple, No JVD  CHEST/LUNG: Clear to auscultation bilaterally; No wheezes or crackles  HEART: Normal S1/S2; Regular rate and rhythm; No murmurs, rubs, or gallops  ABDOMEN: Soft, Nontender, Nondistended; Bowel sounds present  EXTREMITIES: 2+ Peripheral Pulses; No clubbing, cyanosis, or edema  PSYCH: A&Ox3  NEUROLOGY: no focal neurologic deficit  SKIN: No rashes or lesions    LABS:                     RADIOLOGY & ADDITIONAL TESTS:    Imaging Personally Reviewed:    Consultant(s) Notes Reviewed:      Care Discussed with Consultants/Other Providers:

## 2020-07-17 NOTE — PROGRESS NOTE ADULT - ASSESSMENT
73 yo M who denies PMH, presenting [place], found to have dementia and unable to provide a full history awaiting dispo plans by SW. Malae visualized 7/14. Labs 7/14 stable.

## 2020-07-17 NOTE — CHART NOTE - NSCHARTNOTESELECT_GEN_ALL_CORE
Nutrition Services/Malnutrition Follow UP
Event Note
Follow Up/Nutrition Services
MALNUTRITION ALERT/Nutrition Services
Nutrition Follow-up Note/Nutrition Services
Nutrition Follow-up Note/Nutrition Services

## 2020-07-18 PROCEDURE — 99232 SBSQ HOSP IP/OBS MODERATE 35: CPT

## 2020-07-18 RX ADMIN — HEPARIN SODIUM 5000 UNIT(S): 5000 INJECTION INTRAVENOUS; SUBCUTANEOUS at 05:26

## 2020-07-18 RX ADMIN — HEPARIN SODIUM 5000 UNIT(S): 5000 INJECTION INTRAVENOUS; SUBCUTANEOUS at 17:15

## 2020-07-18 RX ADMIN — Medication 6 MILLIGRAM(S): at 21:18

## 2020-07-18 NOTE — PROGRESS NOTE ADULT - SUBJECTIVE AND OBJECTIVE BOX
Georgiana Brand PGY1    Patient is a 74y old  Male who presents with a chief complaint of Dementia, unspecified type (17 Jul 2020 07:10)      SUBJECTIVE / OVERNIGHT EVENTS:    MEDICATIONS  (STANDING):  heparin   Injectable 5000 Unit(s) SubCutaneous every 12 hours  melatonin 6 milliGRAM(s) Oral at bedtime    MEDICATIONS  (PRN):      CAPILLARY BLOOD GLUCOSE        I&O's Summary      Vital Signs Last 24 Hrs  T(C): 36.7 (18 Jul 2020 05:25), Max: 36.9 (17 Jul 2020 12:09)  T(F): 98 (18 Jul 2020 05:25), Max: 98.5 (17 Jul 2020 12:09)  HR: 62 (18 Jul 2020 05:25) (62 - 68)  BP: 119/62 (18 Jul 2020 05:25) (116/56 - 119/62)  BP(mean): --  RR: 17 (18 Jul 2020 05:25) (17 - 17)  SpO2: 98% (18 Jul 2020 05:25) (97% - 100%)    PHYSICAL EXAM:  GENERAL: NAD, thin, lying down  HEAD: Atraumatic, Normocephalic  EYES: EOMI, PERRLA, conjunctiva and sclera clear  NECK: Supple, No JVD  CHEST/LUNG: Clear to auscultation bilaterally; No wheezes or crackles  HEART: Normal S1/S2; Regular rate and rhythm; No murmurs, rubs, or gallops  ABDOMEN: Soft, Nontender, Nondistended; Bowel sounds present  EXTREMITIES: 2+ Peripheral Pulses; No clubbing, cyanosis, or edema  PSYCH: A&Ox3  NEUROLOGY: no focal neurologic deficit  SKIN: No rashes or lesions  LABS:                     RADIOLOGY & ADDITIONAL TESTS:    Imaging Personally Reviewed:    Consultant(s) Notes Reviewed:      Care Discussed with Consultants/Other Providers: Georgiana Cathie PGY1    Patient is a 74y old  Male who presents with a chief complaint of Dementia, unspecified type (17 Jul 2020 07:10)      SUBJECTIVE / OVERNIGHT EVENTS:  - overnight - no events  - am - pt feels well, denies pain. not scratching himself.     MEDICATIONS  (STANDING):  heparin   Injectable 5000 Unit(s) SubCutaneous every 12 hours  melatonin 6 milliGRAM(s) Oral at bedtime    MEDICATIONS  (PRN):      CAPILLARY BLOOD GLUCOSE        I&O's Summary      Vital Signs Last 24 Hrs  T(C): 36.7 (18 Jul 2020 05:25), Max: 36.9 (17 Jul 2020 12:09)  T(F): 98 (18 Jul 2020 05:25), Max: 98.5 (17 Jul 2020 12:09)  HR: 62 (18 Jul 2020 05:25) (62 - 68)  BP: 119/62 (18 Jul 2020 05:25) (116/56 - 119/62)  BP(mean): --  RR: 17 (18 Jul 2020 05:25) (17 - 17)  SpO2: 98% (18 Jul 2020 05:25) (97% - 100%)    PHYSICAL EXAM:  GENERAL: NAD, thin, lying down  HEAD: Atraumatic, Normocephalic  EYES: EOMI, PERRLA, conjunctiva and sclera clear  CHEST/LUNG: Clear to auscultation bilaterally; No wheezes or crackles  HEART: Normal S1/S2; Regular rate and rhythm; No murmurs, rubs, or gallops  ABDOMEN: Soft, Nontender, Nondistended; Bowel sounds present  EXTREMITIES: 2+ Peripheral Pulses; No clubbing, cyanosis, or edema  LABS:                     RADIOLOGY & ADDITIONAL TESTS:    Imaging Personally Reviewed:    Consultant(s) Notes Reviewed:      Care Discussed with Consultants/Other Providers:

## 2020-07-18 NOTE — PROGRESS NOTE ADULT - ASSESSMENT
73 yo M who denies PMH, [place], found to have dementia and unable to provide a full history awaiting dispo plans by SW. Lacey visualized 7/14. Labs 7/14 stable. (Labs rosa every tuesday)

## 2020-07-19 PROCEDURE — 99232 SBSQ HOSP IP/OBS MODERATE 35: CPT

## 2020-07-19 RX ADMIN — HEPARIN SODIUM 5000 UNIT(S): 5000 INJECTION INTRAVENOUS; SUBCUTANEOUS at 05:32

## 2020-07-19 RX ADMIN — HEPARIN SODIUM 5000 UNIT(S): 5000 INJECTION INTRAVENOUS; SUBCUTANEOUS at 17:05

## 2020-07-19 RX ADMIN — Medication 6 MILLIGRAM(S): at 22:47

## 2020-07-19 NOTE — PROGRESS NOTE ADULT - ASSESSMENT
75 yo M who denies PMH, presenting [place], found to have dementia and unable to provide a full history awaiting dispo plans by SW. Lacey visualized 7/14. Labs 7/14 stable. (Labs rosa every tuesday)

## 2020-07-19 NOTE — PROGRESS NOTE ADULT - SUBJECTIVE AND OBJECTIVE BOX
PROGRESS NOTE:   Authoted by Dr. Grady iCntron MD  Pager [phone] [facility], [phone] [facility]     Patient is a 74y old  Male who presents with a chief complaint of Dementia, unspecified type (18 Jul 2020 07:31)      SUBJECTIVE / OVERNIGHT EVENTS: The patient was seen and examined at bedside. No acute events overnight. the patient feels well today.     REVIEW OF SYSTEMS:    CONSTITUTIONAL: No weakness, fevers or chills  EYES/ENT: No visual changes;  No vertigo or throat pain   NECK: No pain or stiffness  RESPIRATORY: No cough, wheezing, hemoptysis; No shortness of breath  CARDIOVASCULAR: No chest pain or palpitations  GASTROINTESTINAL: No abdominal or epigastric pain. No nausea, vomiting, or hematemesis; No diarrhea or constipation. No melena or hematochezia.  GENITOURINARY: No dysuria, frequency or hematuria  NEUROLOGICAL: No numbness or weakness  SKIN: No itching, rashes    MEDICATIONS  (STANDING):  heparin   Injectable 5000 Unit(s) SubCutaneous every 12 hours  melatonin 6 milliGRAM(s) Oral at bedtime      PHYSICAL EXAM:  Vital Signs Last 24 Hrs  T(C): 36.8 (19 Jul 2020 05:31), Max: 36.9 (18 Jul 2020 12:44)  T(F): 98.2 (19 Jul 2020 05:31), Max: 98.5 (18 Jul 2020 12:44)  HR: 63 (19 Jul 2020 05:31) (57 - 63)  BP: 126/58 (19 Jul 2020 05:31) (104/50 - 126/58)  BP(mean): --  RR: 17 (19 Jul 2020 05:31) (16 - 17)  SpO2: 99% (19 Jul 2020 05:31) (98% - 99%)    CONSTITUTIONAL: NAD, underweight, malnourished   RESPIRATORY: Normal respiratory effort; lungs are clear to auscultation bilaterally  CARDIOVASCULAR: Regular rate and rhythm, normal S1 and S2, no murmur/rub/gallop; No lower extremity edema; Peripheral pulses are 2+ bilaterally  ABDOMEN: Nontender to palpation, normoactive bowel sounds, no rebound/guarding; No hepatosplenomegaly  MUSCLOSKELETAL: no clubbing or cyanosis of digits; no joint swelling or tenderness to palpation  PSYCH: A+O to person, place, and time; affect appropriate  SKIN: no visible lice      COORDINATION OF CARE:  Care Discussed with Consultants/Other Providers [Y/N]: Care discussed with social work  Prior or Outpatient Records Reviewed [Y/N]:

## 2020-07-19 NOTE — PROGRESS NOTE ADULT - PROBLEM SELECTOR PLAN 2
Will evaluate specimen when placed at bedside  - lice again visualized 7/14, tx permethrin per derm. Remove all clothes he came in with and change bedding. Contact precautions.    -Derm reconsult if new insects found

## 2020-07-20 PROCEDURE — 99232 SBSQ HOSP IP/OBS MODERATE 35: CPT | Mod: GC

## 2020-07-20 RX ADMIN — HEPARIN SODIUM 5000 UNIT(S): 5000 INJECTION INTRAVENOUS; SUBCUTANEOUS at 17:16

## 2020-07-20 RX ADMIN — HEPARIN SODIUM 5000 UNIT(S): 5000 INJECTION INTRAVENOUS; SUBCUTANEOUS at 06:53

## 2020-07-20 RX ADMIN — Medication 6 MILLIGRAM(S): at 22:01

## 2020-07-20 NOTE — PROGRESS NOTE ADULT - PROBLEM SELECTOR PLAN 4
Patient appears cachectic. Unable to provide history about recent weight changes. Chest xray unremarkable. CMP unremarkable.  Plan:  -Hep B negative  -Hep C negative  -HIV negative Patient appears cachectic. Unable to provide history about recent weight changes. Chest xray unremarkable. CMP unremarkable.  Plan:  -Hep B negative  -Hep C negative  -HIV negative  - plan to check VIT D and lipid panel

## 2020-07-20 NOTE — PROGRESS NOTE ADULT - SUBJECTIVE AND OBJECTIVE BOX
PROGRESS NOTE:   Sohail Velasquez, PGY1, Pager [phone]    Patient is a 74y old  Male who presents with a chief complaint of Dementia, unspecified type (18 Jul 2020 07:31)      SUBJECTIVE / OVERNIGHT EVENTS:     REVIEW OF SYSTEMS:    CONSTITUTIONAL: No weakness, fevers or chills  EYES/ENT: No visual changes;  No vertigo or throat pain   NECK: No pain or stiffness  RESPIRATORY: No cough, wheezing, hemoptysis; No shortness of breath  CARDIOVASCULAR: No chest pain or palpitations  GASTROINTESTINAL: No abdominal or epigastric pain. No nausea, vomiting, or hematemesis; No diarrhea or constipation. No melena or hematochezia.  GENITOURINARY: No dysuria, frequency or hematuria  NEUROLOGICAL: No numbness or weakness  SKIN: No itching, rashes    MEDICATIONS  (STANDING):  heparin   Injectable 5000 Unit(s) SubCutaneous every 12 hours  melatonin 6 milliGRAM(s) Oral at bedtime      PHYSICAL EXAM:  Vital Signs Last 24 Hrs  T(C): 36.4 (19 Jul 2020 22:48), Max: 36.6 (19 Jul 2020 13:44)  T(F): 97.5 (19 Jul 2020 22:48), Max: 97.8 (19 Jul 2020 13:44)  HR: 64 (19 Jul 2020 22:48) (56 - 64)  BP: 111/57 (19 Jul 2020 22:48) (111/57 - 120/64)  BP(mean): --  RR: 18 (19 Jul 2020 22:48) (17 - 18)  SpO2: 100% (19 Jul 2020 22:48) (100% - 100%)    CONSTITUTIONAL: NAD, underweight, malnourished   RESPIRATORY: Normal respiratory effort; lungs are clear to auscultation bilaterally  CARDIOVASCULAR: Regular rate and rhythm, normal S1 and S2, no murmur/rub/gallop; No lower extremity edema; Peripheral pulses are 2+ bilaterally  ABDOMEN: Nontender to palpation, normoactive bowel sounds, no rebound/guarding; No hepatosplenomegaly  MUSCLOSKELETAL: no clubbing or cyanosis of digits; no joint swelling or tenderness to palpation  PSYCH: A+O to person, place, and time; affect appropriate  SKIN: no visible lice      COORDINATION OF CARE:  Care Discussed with Consultants/Other Providers [Y/N]: Care discussed with social work  Prior or Outpatient Records Reviewed [Y/N]: PROGRESS NOTE:   Sohail Velasquez, PGY1, Pager [phone]    Patient is a 74y old  Male who presents with a chief complaint of Dementia, unspecified type (18 Jul 2020 07:31)      SUBJECTIVE / OVERNIGHT EVENTS:     REVIEW OF SYSTEMS:    CONSTITUTIONAL: No weakness, fevers or chills  EYES/ENT: No visual changes;  No vertigo or throat pain   NECK: No pain or stiffness  RESPIRATORY: No cough, wheezing, hemoptysis; No shortness of breath  CARDIOVASCULAR: No chest pain or palpitations  GASTROINTESTINAL: No abdominal or epigastric pain. No nausea, vomiting, or hematemesis; No diarrhea or constipation. No melena or hematochezia.  GENITOURINARY: No dysuria, frequency or hematuria  NEUROLOGICAL: No numbness or weakness  SKIN: No itching, rashes    MEDICATIONS  (STANDING):  heparin   Injectable 5000 Unit(s) SubCutaneous every 12 hours  melatonin 6 milliGRAM(s) Oral at bedtime    MEDICATIONS  (PRN):    PHYSICAL EXAM:  Vital Signs Last 24 Hrs  T(C): 36.4 (19 Jul 2020 22:48), Max: 36.6 (19 Jul 2020 13:44)  T(F): 97.5 (19 Jul 2020 22:48), Max: 97.8 (19 Jul 2020 13:44)  HR: 64 (19 Jul 2020 22:48) (56 - 64)  BP: 111/57 (19 Jul 2020 22:48) (111/57 - 120/64)  BP(mean): --  RR: 18 (19 Jul 2020 22:48) (17 - 18)  SpO2: 100% (19 Jul 2020 22:48) (100% - 100%)    CONSTITUTIONAL: NAD, underweight, malnourished   RESPIRATORY: Normal respiratory effort; lungs are clear to auscultation bilaterally  CARDIOVASCULAR: Regular rate and rhythm, normal S1 and S2, no murmur/rub/gallop; No lower extremity edema; Peripheral pulses are 2+ bilaterally  ABDOMEN: Nontender to palpation, normoactive bowel sounds, no rebound/guarding; No hepatosplenomegaly  MUSCLOSKELETAL: no clubbing or cyanosis of digits; no joint swelling or tenderness to palpation  PSYCH: A+O to person, place, and time; affect appropriate  SKIN: no visible lice      COORDINATION OF CARE:  Care Discussed with Consultants/Other Providers [Y/N]: Care discussed with social work  Prior or Outpatient Records Reviewed [Y/N]: PROGRESS NOTE:   Sohail Velasquez, PGY1, Pager [phone]    Patient is a 74y old  Male who presents with a chief complaint of Dementia, unspecified type (18 Jul 2020 07:31).      SUBJECTIVE / OVERNIGHT EVENTS: No events overnight. No complaints.     REVIEW OF SYSTEMS:    CONSTITUTIONAL: No weakness, fevers or chills  EYES/ENT: No visual changes;  No vertigo or throat pain   NECK: No pain or stiffness  RESPIRATORY: No cough, wheezing, hemoptysis; No shortness of breath  CARDIOVASCULAR: No chest pain or palpitations  GASTROINTESTINAL: No abdominal or epigastric pain. No nausea, vomiting, or hematemesis; No diarrhea or constipation. No melena or hematochezia.  GENITOURINARY: No dysuria, frequency or hematuria  NEUROLOGICAL: No numbness or weakness  SKIN: No itching, rashes    MEDICATIONS  (STANDING):  heparin   Injectable 5000 Unit(s) SubCutaneous every 12 hours  melatonin 6 milliGRAM(s) Oral at bedtime    MEDICATIONS  (PRN):    PHYSICAL EXAM:  Vital Signs Last 24 Hrs  T(C): 36.3 (20 Jul 2020 13:08), Max: 36.7 (20 Jul 2020 06:55)  T(F): 97.3 (20 Jul 2020 13:08), Max: 98 (20 Jul 2020 06:55)  HR: 62 (20 Jul 2020 13:08) (56 - 64)  BP: 127/53 (20 Jul 2020 13:08) (106/55 - 127/53)  BP(mean): --  RR: 18 (20 Jul 2020 13:08) (17 - 18)  SpO2: 97% (20 Jul 2020 13:08) (97% - 100%)    CONSTITUTIONAL: NAD, underweight, malnourished   RESPIRATORY: Normal respiratory effort; lungs are clear to auscultation bilaterally  CARDIOVASCULAR: Regular rate and rhythm, normal S1 and S2, no murmur/rub/gallop; No lower extremity edema; Peripheral pulses are 2+ bilaterally  ABDOMEN: Nontender to palpation, normoactive bowel sounds, no rebound/guarding; No hepatosplenomegaly  MUSCLOSKELETAL: no clubbing or cyanosis of digits; no joint swelling or tenderness to palpation  PSYCH: A+O to person, place, and time; affect appropriate  SKIN: no visible lice      COORDINATION OF CARE:  Care Discussed with Consultants/Other Providers [Y/N]: Care discussed with social work  Prior or Outpatient Records Reviewed [Y/N]:

## 2020-07-20 NOTE — PROGRESS NOTE ADULT - PROBLEM SELECTOR PLAN 2
Will evaluate specimen when placed at bedside  - lice again visualized 7/14, tx permethrin per derm. Remove all clothes he came in with and change bedding. Contact precautions.    -Derm reconsult if new insects found Will evaluate specimen when placed at bedside  - lice again visualized 7/14, tx permethrin per derm. Remove all clothes he came in with and change bedding. Contact precautions.    -Derm reconsult to determine need for another treatment

## 2020-07-21 LAB
24R-OH-CALCIDIOL SERPL-MCNC: 28.5 NG/ML — LOW (ref 30–80)
CHOLEST SERPL-MCNC: 174 MG/DL — SIGNIFICANT CHANGE UP (ref 120–199)
HDLC SERPL-MCNC: 68 MG/DL — HIGH (ref 35–55)
LIPID PNL WITH DIRECT LDL SERPL: 100 MG/DL — SIGNIFICANT CHANGE UP
TRIGL SERPL-MCNC: 77 MG/DL — SIGNIFICANT CHANGE UP (ref 10–149)

## 2020-07-21 PROCEDURE — 99232 SBSQ HOSP IP/OBS MODERATE 35: CPT | Mod: GC

## 2020-07-21 RX ORDER — PERMETHRIN CREAM 5% W/W 50 MG/G
1 CREAM TOPICAL ONCE
Refills: 0 | Status: COMPLETED | OUTPATIENT
Start: 2020-07-21 | End: 2020-07-21

## 2020-07-21 RX ADMIN — PERMETHRIN CREAM 5% W/W 1 APPLICATION(S): 50 CREAM TOPICAL at 17:09

## 2020-07-21 RX ADMIN — HEPARIN SODIUM 5000 UNIT(S): 5000 INJECTION INTRAVENOUS; SUBCUTANEOUS at 06:14

## 2020-07-21 RX ADMIN — HEPARIN SODIUM 5000 UNIT(S): 5000 INJECTION INTRAVENOUS; SUBCUTANEOUS at 17:09

## 2020-07-21 RX ADMIN — Medication 6 MILLIGRAM(S): at 21:47

## 2020-07-21 NOTE — PROGRESS NOTE ADULT - PROBLEM SELECTOR PLAN 2
Will evaluate specimen when placed at bedside  - lice again visualized 7/14, tx permethrin per derm. Remove all clothes he came in with and change bedding. Contact precautions.    -Derm reconsult to determine need for another treatment Will evaluate specimen when placed at bedside  - lice again visualized 7/14, tx permethrin per derm. Remove all clothes he came in with and change bedding. Contact precautions.    -Derm consulted and will receive another treatment today 7/21

## 2020-07-21 NOTE — PROGRESS NOTE ADULT - PROBLEM SELECTOR PLAN 4
Patient appears cachectic. Unable to provide history about recent weight changes. Chest xray unremarkable. CMP unremarkable.  Plan:  -Hep B negative  -Hep C negative  -HIV negative  - plan to check VIT D and lipid panel

## 2020-07-21 NOTE — PROGRESS NOTE ADULT - SUBJECTIVE AND OBJECTIVE BOX
Sohail Velasquez  PGY1/Internal Medicine [id number]    JOHN BROWNLEE  74y  Male      Patient is a 74y old  Male who presents with a chief complaint of Dementia, unspecified type (20 Jul 2020 06:44)      INTERVAL HPI/OVERNIGHT EVENTS:    REVIEW OF SYSTEMS:  CONSTITUTIONAL: No fever, weight loss, or fatigue  EYES: No eye pain, visual disturbances, or discharge  ENMT:  No difficulty hearing, tinnitus, vertigo; No sinus or throat pain  NECK: No pain or stiffness  RESPIRATORY: No cough, wheezing, chills or hemoptysis; No shortness of breath  CARDIOVASCULAR: No chest pain, palpitations, dizziness, or leg swelling  GASTROINTESTINAL: No abdominal or epigastric pain. No diarrhea or constipation. No nausea, vomiting, or hematemesis. No melena or hematochezia.  GENITOURINARY: No dysuria, frequency, hematuria, or incontinence  NEUROLOGICAL: No headaches, memory loss, loss of strength, numbness, or tremors  MUSCULOSKELETAL: No joint pain or swelling; No muscle, back, or extremity pain  SKIN: No itching, burning, rashes, or lesions   LYMPH NODES: No enlarged glands  ENDOCRINE: No heat or cold intolerance; No hair loss  PSYCHIATRIC: No depression, anxiety, mood swings, or difficulty sleeping  HEME/LYMPH: No easy bruising, or bleeding gums  ALLERY AND IMMUNOLOGIC: No hives or eczema    Vital Signs Last 24 Hrs  T(C): 36.9 (21 Jul 2020 06:12), Max: 36.9 (21 Jul 2020 06:12)  T(F): 98.4 (21 Jul 2020 06:12), Max: 98.4 (21 Jul 2020 06:12)  HR: 55 (21 Jul 2020 06:12) (55 - 62)  BP: 116/60 (21 Jul 2020 06:12) (106/55 - 127/53)  BP(mean): --  RR: 16 (21 Jul 2020 06:12) (16 - 18)  SpO2: 99% (21 Jul 2020 06:12) (97% - 100%)    PHYSICAL EXAM:  GENERAL: NAD, well-groomed, well-developed  HEAD:  Atraumatic, Normocephalic  EYES: EOMI, PERRLA, conjunctiva and sclera clear  ENMT: Moist mucous membranes, Good dentition, No lesions  NECK: Supple, No JVD, Normal thyroid  NERVOUS SYSTEM:  Alert & Oriented X3, Good concentration; Motor Strength 5/5 B/L upper and lower extremities; DTRs 2+ intact and symmetric  CHEST/LUNG: Clear to auscultation bilaterally; No rales, rhonchi, wheezing, or rubs  HEART: Regular rate and rhythm; No murmurs, rubs, or gallops  ABDOMEN: Soft, Nontender, Nondistended; Bowel sounds present  EXTREMITIES:  2+ Peripheral Pulses, No clubbing, cyanosis, or edema  LYMPH: No lymphadenopathy noted  SKIN: No rashes or lesions    Consultant(s) Notes Reviewed:  [x ] YES  [ ] NO  Care Discussed with Consultants/Other Providers [ x] YES  [ ] NO    LABS:                CAPILLARY BLOOD GLUCOSE          RADIOLOGY & ADDITIONAL TESTS:    Imaging Personally Reviewed:  [ ] YES  [ ] NO Sohail Velasquez  PGY1/Internal Medicine [id number]    JOHN BROWNLEE  74y  Male      Patient is a 74y old  Male who presents with a chief complaint of Dementia, unspecified type (20 Jul 2020 06:44)      INTERVAL HPI/OVERNIGHT EVENTS: No overnight events. No complaints.     REVIEW OF SYSTEMS:  CONSTITUTIONAL: No fever, weight loss, or fatigue  EYES: No eye pain, visual disturbances, or discharge  ENMT:  No difficulty hearing, tinnitus, vertigo; No sinus or throat pain  NECK: No pain or stiffness  RESPIRATORY: No cough, wheezing, chills or hemoptysis; No shortness of breath  CARDIOVASCULAR: No chest pain, palpitations, dizziness, or leg swelling  GASTROINTESTINAL: No abdominal or epigastric pain. No diarrhea or constipation. No nausea, vomiting, or hematemesis. No melena or hematochezia.  GENITOURINARY: No dysuria, frequency, hematuria, or incontinence  NEUROLOGICAL: No headaches, memory loss, loss of strength, numbness, or tremors  MUSCULOSKELETAL: No joint pain or swelling; No muscle, back, or extremity pain  SKIN: No itching, burning, rashes, or lesions   LYMPH NODES: No enlarged glands  ENDOCRINE: No heat or cold intolerance; No hair loss  PSYCHIATRIC: No depression, anxiety, mood swings, or difficulty sleeping  HEME/LYMPH: No easy bruising, or bleeding gums  ALLERY AND IMMUNOLOGIC: No hives or eczema    Vital Signs Last 24 Hrs  T(C): 36.9 (21 Jul 2020 06:12), Max: 36.9 (21 Jul 2020 06:12)  T(F): 98.4 (21 Jul 2020 06:12), Max: 98.4 (21 Jul 2020 06:12)  HR: 55 (21 Jul 2020 06:12) (55 - 62)  BP: 116/60 (21 Jul 2020 06:12) (106/55 - 127/53)  BP(mean): --  RR: 16 (21 Jul 2020 06:12) (16 - 18)  SpO2: 99% (21 Jul 2020 06:12) (97% - 100%)    PHYSICAL EXAM:  GENERAL: NAD, well-groomed, well-developed  HEAD:  Atraumatic, Normocephalic  EYES: EOMI, PERRLA, conjunctiva and sclera clear  ENMT: Moist mucous membranes, Good dentition, No lesions  NECK: Supple, No JVD, Normal thyroid  NERVOUS SYSTEM:  Alert & Oriented X3, Good concentration; Motor Strength 5/5 B/L upper and lower extremities; DTRs 2+ intact and symmetric  CHEST/LUNG: Clear to auscultation bilaterally; No rales, rhonchi, wheezing, or rubs  HEART: Regular rate and rhythm; No murmurs, rubs, or gallops  ABDOMEN: Soft, Nontender, Nondistended; Bowel sounds present  EXTREMITIES:  2+ Peripheral Pulses, No clubbing, cyanosis, or edema  LYMPH: No lymphadenopathy noted  SKIN: No rashes or lesions; no lice seen     Consultant(s) Notes Reviewed:  [x ] YES  [ ] NO  Care Discussed with Consultants/Other Providers [ x] YES  [ ] NO    LABS:  Vitamin D, 25-Hydroxy (07.21.20 @ 07:05)    Vitamin D, 25-Hydroxy: 28.5: VITD Interpretive Data Result:  30.0-80.0 ng/mL Optimal levels (Reference Range)  >80.0 ng/mL Toxicity possible  20.0-29.0 ng/mL Insufficiency  10.0-19.0 ng/mL Mild to Moderate Deficiency  <10.0 ng/mL Severe Deficiency  Optimal levels for 25-Hydroxy vitamin D are 30.0 ng/mL and  above based upon the Endocrine Society guidelines 2011.  However, there is a lack of consensus on this and the  [place] [employer] recommends 20.0 ng/mL and above as  optimal levels.  Vitamin D results may vary depending on  the method of analysis.  The Roche juan e801  electrochemiluminescent immunoassay method measures both  D2 and D3. ng/mL      Lipid Profile in AM (07.21.20 @ 07:05)    Cholesterol, Serum: 174 mg/dL    Triglycerides, Serum: 77 mg/dL    HDL Cholesterol, Serum: 68 mg/dL    Direct LDL: 100:   RESULT (mg/dL)   (For adults 18 years and older)  ----------------------------------------------------------  Below 70         Ideal for people at very high risk of  heart disease  Below 100        Ideal for people at risk of heart disease  100 - 129        [place]  130 - 159        Borderline high  160 - 189        High  190 and above    Very high mg/dL                    CAPILLARY BLOOD GLUCOSE          RADIOLOGY & ADDITIONAL TESTS:    Imaging Personally Reviewed:  [ ] YES  [ ] NO

## 2020-07-22 PROCEDURE — 99232 SBSQ HOSP IP/OBS MODERATE 35: CPT | Mod: GC

## 2020-07-22 RX ORDER — ENOXAPARIN SODIUM 100 MG/ML
40 INJECTION SUBCUTANEOUS DAILY
Refills: 0 | Status: DISCONTINUED | OUTPATIENT
Start: 2020-07-22 | End: 2020-07-22

## 2020-07-22 RX ORDER — ENOXAPARIN SODIUM 100 MG/ML
30 INJECTION SUBCUTANEOUS DAILY
Refills: 0 | Status: DISCONTINUED | OUTPATIENT
Start: 2020-07-22 | End: 2020-07-24

## 2020-07-22 RX ADMIN — Medication 6 MILLIGRAM(S): at 22:23

## 2020-07-22 RX ADMIN — ENOXAPARIN SODIUM 30 MILLIGRAM(S): 100 INJECTION SUBCUTANEOUS at 13:27

## 2020-07-22 RX ADMIN — HEPARIN SODIUM 5000 UNIT(S): 5000 INJECTION INTRAVENOUS; SUBCUTANEOUS at 06:04

## 2020-07-22 NOTE — PROVIDER CONTACT NOTE (OTHER) - SITUATION
/50
/49
/50
/51
/52
/53
/53
/56
DBP low
Patient with bp 101/51 and denies symptoms.
Patient with bp 109/52 and denies symptoms.
bp110/43
patient heart rate less than 55. 53 bpm and bp diastolic less than 55  bp 109/54 .
/50
patient heart rate less than 55. 53 bpm and bp diastolic less than 55  bp 109/54 .

## 2020-07-22 NOTE — PROVIDER CONTACT NOTE (OTHER) - REASON
/43
/49
/50
/51
/52
/53
/53
/56
DBP low
Social Work addendum
Social Work note
bp 101/51
bp 109/52
patient heart rate less than 55. 53 bpm and bp diastolic less than 55
/50
patient heart rate less than 55. 53 bpm and bp diastolic less than 55
/50

## 2020-07-22 NOTE — PROVIDER CONTACT NOTE (OTHER) - NAME OF MD/NP/PA/DO NOTIFIED:
MD Gonzalez
DR Gonzalez
Denny Brand md
Dr. Chinchilla
Dr. Chinchilla
Dr. Chinchilla notified
Dr. [facility] Jaime
Dr. Valiente
Hugh James
MD Gonzalez
Migeul Angel Anthony
Miguel Angel Anthony
[place]
Md Gaxiola
Denny Brand md

## 2020-07-22 NOTE — PROGRESS NOTE ADULT - PROBLEM SELECTOR PLAN 2
Will evaluate specimen when placed at bedside  - lice again visualized 7/14, tx permethrin per derm. Remove all clothes he came in with and change bedding. Contact precautions.    -s/p 3x treatments of permethrin Will evaluate specimen when placed at bedside  - lice again visualized 7/14, tx permethrin per derm. Remove all clothes he came in with and change bedding. Contact precautions.    -s/p 3x treatments of permethrin  - Lice is gone on examination

## 2020-07-22 NOTE — PROGRESS NOTE ADULT - SUBJECTIVE AND OBJECTIVE BOX
Sohail Velasquez  PGY1/Internal Medicine [id number]    JOHN BROWNLEE  74y  Male      Patient is a 74y old  Male who presents with a chief complaint of Dementia, unspecified type (21 Jul 2020 06:44)      INTERVAL HPI/OVERNIGHT EVENTS: No overnight events. No complaints.     REVIEW OF SYSTEMS:  CONSTITUTIONAL: No fever, weight loss, or fatigue  EYES: No eye pain, visual disturbances, or discharge  ENMT:  No difficulty hearing, tinnitus, vertigo; No sinus or throat pain  NECK: No pain or stiffness  RESPIRATORY: No cough, wheezing, chills or hemoptysis; No shortness of breath  CARDIOVASCULAR: No chest pain, palpitations, dizziness, or leg swelling  GASTROINTESTINAL: No abdominal or epigastric pain. No diarrhea or constipation. No nausea, vomiting, or hematemesis. No melena or hematochezia.  GENITOURINARY: No dysuria, frequency, hematuria, or incontinence  NEUROLOGICAL: No headaches, memory loss, loss of strength, numbness, or tremors  MUSCULOSKELETAL: No joint pain or swelling; No muscle, back, or extremity pain  SKIN: No itching, burning, rashes, or lesions   LYMPH NODES: No enlarged glands  ENDOCRINE: No heat or cold intolerance; No hair loss  PSYCHIATRIC: No depression, anxiety, mood swings, or difficulty sleeping  HEME/LYMPH: No easy bruising, or bleeding gums  ALLERY AND IMMUNOLOGIC: No hives or eczema    Vital Signs Last 24 Hrs  T(C): 36.9 (22 Jul 2020 06:02), Max: 36.9 (21 Jul 2020 21:44)  T(F): 98.4 (22 Jul 2020 06:02), Max: 98.5 (21 Jul 2020 21:44)  HR: 57 (22 Jul 2020 06:02) (55 - 65)  BP: 118/61 (22 Jul 2020 06:02) (116/52 - 123/52)  BP(mean): --  RR: 16 (22 Jul 2020 06:02) (16 - 17)  SpO2: 100% (22 Jul 2020 06:02) (98% - 100%)    PHYSICAL EXAM:  GENERAL: NAD, well-groomed, well-developed  HEAD:  Atraumatic, Normocephalic  EYES: EOMI, PERRLA, conjunctiva and sclera clear  ENMT: Moist mucous membranes, Good dentition, No lesions  NECK: Supple, No JVD, Normal thyroid  NERVOUS SYSTEM:  Alert & Oriented X3, Good concentration; Motor Strength 5/5 B/L upper and lower extremities; DTRs 2+ intact and symmetric  CHEST/LUNG: Clear to auscultation bilaterally; No rales, rhonchi, wheezing, or rubs  HEART: Regular rate and rhythm; No murmurs, rubs, or gallops  ABDOMEN: Soft, Nontender, Nondistended; Bowel sounds present  EXTREMITIES:  2+ Peripheral Pulses, No clubbing, cyanosis, or edema  LYMPH: No lymphadenopathy noted  SKIN: No rashes or lesions; no lice seen     Consultant(s) Notes Reviewed:  [x ] YES  [ ] NO  Care Discussed with Consultants/Other Providers [ x] YES  [ ] NO    LABS:  Vitamin D, 25-Hydroxy (07.21.20 @ 07:05)    Vitamin D, 25-Hydroxy: 28.5: VITD Interpretive Data Result:  30.0-80.0 ng/mL Optimal levels (Reference Range)  >80.0 ng/mL Toxicity possible  20.0-29.0 ng/mL Insufficiency  10.0-19.0 ng/mL Mild to Moderate Deficiency  <10.0 ng/mL Severe Deficiency  Optimal levels for 25-Hydroxy vitamin D are 30.0 ng/mL and  above based upon the Endocrine Society guidelines 2011.  However, there is a lack of consensus on this and the  [place] of Medicine recommends 20.0 ng/mL and above as  optimal levels.  Vitamin D results may vary depending on  the method of analysis.  The Roche juan e801  electrochemiluminescent immunoassay method measures both  D2 and D3. ng/mL      Lipid Profile in AM (07.21.20 @ 07:05)    Cholesterol, Serum: 174 mg/dL    Triglycerides, Serum: 77 mg/dL    HDL Cholesterol, Serum: 68 mg/dL    Direct LDL: 100:   RESULT (mg/dL)   (For adults 18 years and older)  ----------------------------------------------------------  Below 70         Ideal for people at very high risk of  heart disease  Below 100        Ideal for people at risk of heart disease  100 - 129        [place]  130 - 159        Borderline high  160 - 189        High  190 and above    Very high mg/dL                    CAPILLARY BLOOD GLUCOSE          RADIOLOGY & ADDITIONAL TESTS:    Imaging Personally Reviewed:  [ ] YES  [ ] NO Sohail Velasquez  PGY1/Internal Medicine [id number]    JOHN BROWNLEE  74y  Male      Patient is a 74y old  Male who presents with a chief complaint of Dementia, unspecified type (22 Jul 2020 06:44)      INTERVAL HPI/OVERNIGHT EVENTS: No overnight events. No complaints.     REVIEW OF SYSTEMS:  CONSTITUTIONAL: No fever, weight loss, or fatigue  EYES: No eye pain, visual disturbances, or discharge  ENMT:  No difficulty hearing, tinnitus, vertigo; No sinus or throat pain  NECK: No pain or stiffness  RESPIRATORY: No cough, wheezing, chills or hemoptysis; No shortness of breath  CARDIOVASCULAR: No chest pain, palpitations, dizziness, or leg swelling  GASTROINTESTINAL: No abdominal or epigastric pain. No diarrhea or constipation. No nausea, vomiting, or hematemesis. No melena or hematochezia.  GENITOURINARY: No dysuria, frequency, hematuria, or incontinence  NEUROLOGICAL: No headaches, memory loss, loss of strength, numbness, or tremors  MUSCULOSKELETAL: No joint pain or swelling; No muscle, back, or extremity pain  SKIN: No itching, burning, rashes, or lesions   LYMPH NODES: No enlarged glands  ENDOCRINE: No heat or cold intolerance; No hair loss  PSYCHIATRIC: No depression, anxiety, mood swings, or difficulty sleeping  HEME/LYMPH: No easy bruising, or bleeding gums  ALLERY AND IMMUNOLOGIC: No hives or eczema    Vital Signs Last 24 Hrs  T(C): 36.9 (22 Jul 2020 06:02), Max: 36.9 (21 Jul 2020 21:44)  T(F): 98.4 (22 Jul 2020 06:02), Max: 98.5 (21 Jul 2020 21:44)  HR: 57 (22 Jul 2020 06:02) (55 - 65)  BP: 118/61 (22 Jul 2020 06:02) (116/52 - 123/52)  BP(mean): --  RR: 16 (22 Jul 2020 06:02) (16 - 17)  SpO2: 100% (22 Jul 2020 06:02) (98% - 100%)    PHYSICAL EXAM:  GENERAL: NAD, well-groomed, well-developed  HEAD:  Atraumatic, Normocephalic  EYES: EOMI, PERRLA, conjunctiva and sclera clear  ENMT: Moist mucous membranes, Good dentition, No lesions  NECK: Supple, No JVD, Normal thyroid  NERVOUS SYSTEM:  Alert & Oriented X3, Good concentration; Motor Strength 5/5 B/L upper and lower extremities; DTRs 2+ intact and symmetric  CHEST/LUNG: Clear to auscultation bilaterally; No rales, rhonchi, wheezing, or rubs  HEART: Regular rate and rhythm; No murmurs, rubs, or gallops  ABDOMEN: Soft, Nontender, Nondistended; Bowel sounds present  EXTREMITIES:  2+ Peripheral Pulses, No clubbing, cyanosis, or edema  LYMPH: No lymphadenopathy noted  SKIN: No rashes or lesions; no lice seen     Consultant(s) Notes Reviewed:  [x ] YES  [ ] NO  Care Discussed with Consultants/Other Providers [ x] YES  [ ] NO    LABS:  Vitamin D, 25-Hydroxy (07.21.20 @ 07:05)    Vitamin D, 25-Hydroxy: 28.5: VITD Interpretive Data Result:  30.0-80.0 ng/mL Optimal levels (Reference Range)  >80.0 ng/mL Toxicity possible  20.0-29.0 ng/mL Insufficiency  10.0-19.0 ng/mL Mild to Moderate Deficiency  <10.0 ng/mL Severe Deficiency  Optimal levels for 25-Hydroxy vitamin D are 30.0 ng/mL and  above based upon the Endocrine Society guidelines 2011.  However, there is a lack of consensus on this and the  [place] of Medicine recommends 20.0 ng/mL and above as  optimal levels.  Vitamin D results may vary depending on  the method of analysis.  The Roche juan e801  electrochemiluminescent immunoassay method measures both  D2 and D3. ng/mL      Lipid Profile in AM (07.21.20 @ 07:05)    Cholesterol, Serum: 174 mg/dL    Triglycerides, Serum: 77 mg/dL    HDL Cholesterol, Serum: 68 mg/dL    Direct LDL: 100:   RESULT (mg/dL)   (For adults 18 years and older)  ----------------------------------------------------------  Below 70         Ideal for people at very high risk of  heart disease  Below 100        Ideal for people at risk of heart disease  100 - 129        [place]  130 - 159        Borderline high  160 - 189        High  190 and above    Very high mg/dL                    CAPILLARY BLOOD GLUCOSE          RADIOLOGY & ADDITIONAL TESTS:    Imaging Personally Reviewed:  [ ] YES  [ ] NO Sohail Velasquez  PGY1/Internal Medicine [id number]    JOHN BROWNLEE  74y  Male      Patient is a 74y old  Male who presents with a chief complaint of Dementia, unspecified type (22 Jul 2020 06:44)      INTERVAL HPI/OVERNIGHT EVENTS: No overnight events. No complaints. Pt appears slightly agitated.     REVIEW OF SYSTEMS:  REVIEW OF SYSTEMS:  CONSTITUTIONAL: No weakness, fevers or chills  EYES/ENT: No visual changes;  No vertigo or throat pain   NECK: No pain or stiffness  RESPIRATORY: No cough, wheezing, hemoptysis; No shortness of breath  CARDIOVASCULAR: No chest pain or palpitations  GASTROINTESTINAL: No abdominal or epigastric pain. No nausea, vomiting, or hematemesis; No diarrhea or constipation. No melena or hematochezia.  GENITOURINARY: No dysuria, frequency or hematuria  NEUROLOGICAL: No numbness or weakness  SKIN: No itching, rashes      Vital Signs Last 24 Hrs  T(C): 36.9 (22 Jul 2020 06:02), Max: 36.9 (21 Jul 2020 21:44)  T(F): 98.4 (22 Jul 2020 06:02), Max: 98.5 (21 Jul 2020 21:44)  HR: 57 (22 Jul 2020 06:02) (55 - 65)  BP: 118/61 (22 Jul 2020 06:02) (116/52 - 123/52)  BP(mean): --  RR: 16 (22 Jul 2020 06:02) (16 - 17)  SpO2: 100% (22 Jul 2020 06:02) (98% - 100%)    PHYSICAL EXAM:  GENERAL: NAD, well-groomed, well-developed  HEAD:  Atraumatic, Normocephalic  EYES: EOMI, PERRLA, conjunctiva and sclera clear  ENMT: Moist mucous membranes, Good dentition, No lesions  NECK: Supple, No JVD, Normal thyroid  NERVOUS SYSTEM:  Alert & Oriented X3, Good concentration; Motor Strength 5/5 B/L upper and lower extremities; DTRs 2+ intact and symmetric  CHEST/LUNG: Clear to auscultation bilaterally; No rales, rhonchi, wheezing, or rubs  HEART: Regular rate and rhythm; No murmurs, rubs, or gallops  ABDOMEN: Soft, Nontender, Nondistended; Bowel sounds present  EXTREMITIES:  2+ Peripheral Pulses, No clubbing, cyanosis, or edema  LYMPH: No lymphadenopathy noted  SKIN: No rashes or lesions; no lice seen     Consultant(s) Notes Reviewed:  [x ] YES  [ ] NO  Care Discussed with Consultants/Other Providers [ x] YES  [ ] NO    LABS:  Vitamin D, 25-Hydroxy (07.21.20 @ 07:05)    Vitamin D, 25-Hydroxy: 28.5: VITD Interpretive Data Result:  30.0-80.0 ng/mL Optimal levels (Reference Range)  >80.0 ng/mL Toxicity possible  20.0-29.0 ng/mL Insufficiency  10.0-19.0 ng/mL Mild to Moderate Deficiency  <10.0 ng/mL Severe Deficiency  Optimal levels for 25-Hydroxy vitamin D are 30.0 ng/mL and  above based upon the Endocrine Society guidelines 2011.  However, there is a lack of consensus on this and the  [place] of Medicine recommends 20.0 ng/mL and above as  optimal levels.  Vitamin D results may vary depending on  the method of analysis.  The Roche juan e801  electrochemiluminescent immunoassay method measures both  D2 and D3. ng/mL      Lipid Profile in AM (07.21.20 @ 07:05)    Cholesterol, Serum: 174 mg/dL    Triglycerides, Serum: 77 mg/dL    HDL Cholesterol, Serum: 68 mg/dL    Direct LDL: 100:   RESULT (mg/dL)   (For adults 18 years and older)  ----------------------------------------------------------  Below 70         Ideal for people at very high risk of  heart disease  Below 100        Ideal for people at risk of heart disease  100 - 129        [place]  130 - 159        Borderline high  160 - 189        High  190 and above    Very high mg/dL                    CAPILLARY BLOOD GLUCOSE          RADIOLOGY & ADDITIONAL TESTS:    Imaging Personally Reviewed:  [ ] YES  [ ] NO Sohail Velasquez  PGY1/Internal Medicine [id number]    JOHN BROWNLEE  74y  Male      Patient is a 74y old  Male who presents with a chief complaint of Dementia, unspecified type (22 Jul 2020 06:44)      INTERVAL HPI/OVERNIGHT EVENTS: No overnight events. No complaints. Pt appears slightly agitated. No lice seen.     REVIEW OF SYSTEMS:  REVIEW OF SYSTEMS:  CONSTITUTIONAL: No weakness, fevers or chills  EYES/ENT: No visual changes;  No vertigo or throat pain   NECK: No pain or stiffness  RESPIRATORY: No cough, wheezing, hemoptysis; No shortness of breath  CARDIOVASCULAR: No chest pain or palpitations  GASTROINTESTINAL: No abdominal or epigastric pain. No nausea, vomiting, or hematemesis; No diarrhea or constipation. No melena or hematochezia.  GENITOURINARY: No dysuria, frequency or hematuria  NEUROLOGICAL: No numbness or weakness  SKIN: No itching, rashes      Vital Signs Last 24 Hrs  T(C): 36.9 (22 Jul 2020 06:02), Max: 36.9 (21 Jul 2020 21:44)  T(F): 98.4 (22 Jul 2020 06:02), Max: 98.5 (21 Jul 2020 21:44)  HR: 57 (22 Jul 2020 06:02) (55 - 65)  BP: 118/61 (22 Jul 2020 06:02) (116/52 - 123/52)  BP(mean): --  RR: 16 (22 Jul 2020 06:02) (16 - 17)  SpO2: 100% (22 Jul 2020 06:02) (98% - 100%)    PHYSICAL EXAM:  GENERAL: NAD, well-groomed, well-developed  HEAD:  Atraumatic, Normocephalic  EYES: EOMI, PERRLA, conjunctiva and sclera clear  ENMT: Moist mucous membranes, Good dentition, No lesions  NECK: Supple, No JVD, Normal thyroid  NERVOUS SYSTEM:  Alert & Oriented X3, Good concentration; Motor Strength 5/5 B/L upper and lower extremities; DTRs 2+ intact and symmetric  CHEST/LUNG: Clear to auscultation bilaterally; No rales, rhonchi, wheezing, or rubs  HEART: Regular rate and rhythm; No murmurs, rubs, or gallops  ABDOMEN: Soft, Nontender, Nondistended; Bowel sounds present  EXTREMITIES:  2+ Peripheral Pulses, No clubbing, cyanosis, or edema  LYMPH: No lymphadenopathy noted  SKIN: No rashes or lesions; no lice seen     Consultant(s) Notes Reviewed:  [x ] YES  [ ] NO  Care Discussed with Consultants/Other Providers [ x] YES  [ ] NO    LABS:  Vitamin D, 25-Hydroxy (07.21.20 @ 07:05)    Vitamin D, 25-Hydroxy: 28.5: VITD Interpretive Data Result:  30.0-80.0 ng/mL Optimal levels (Reference Range)  >80.0 ng/mL Toxicity possible  20.0-29.0 ng/mL Insufficiency  10.0-19.0 ng/mL Mild to Moderate Deficiency  <10.0 ng/mL Severe Deficiency  Optimal levels for 25-Hydroxy vitamin D are 30.0 ng/mL and  above based upon the Endocrine Society guidelines 2011.  However, there is a lack of consensus on this and the  [place] of Medicine recommends 20.0 ng/mL and above as  optimal levels.  Vitamin D results may vary depending on  the method of analysis.  The Roche juan e801  electrochemiluminescent immunoassay method measures both  D2 and D3. ng/mL      Lipid Profile in AM (07.21.20 @ 07:05)    Cholesterol, Serum: 174 mg/dL    Triglycerides, Serum: 77 mg/dL    HDL Cholesterol, Serum: 68 mg/dL    Direct LDL: 100:   RESULT (mg/dL)   (For adults 18 years and older)  ----------------------------------------------------------  Below 70         Ideal for people at very high risk of  heart disease  Below 100        Ideal for people at risk of heart disease  100 - 129        [place]  130 - 159        Borderline high  160 - 189        High  190 and above    Very high mg/dL                    CAPILLARY BLOOD GLUCOSE          RADIOLOGY & ADDITIONAL TESTS:    Imaging Personally Reviewed:  [ ] YES  [ ] NO

## 2020-07-22 NOTE — PROGRESS NOTE ADULT - PROBLEM SELECTOR PLAN 4
Patient appears cachectic. Unable to provide history about recent weight changes. Chest xray unremarkable. CMP unremarkable.  Plan:  -Hep B negative  -Hep C negative  -HIV negative  - lipid panel normal and vit. D slightly low

## 2020-07-22 NOTE — PROVIDER CONTACT NOTE (OTHER) - DATE AND TIME:
06-Jul-2020 13:00
06-Jul-2020 22:00
07-Jul-2020 05:20
10-Jul-2020 12:40
10-Jul-2020 12:40
15-Jul-2020 21:39
20-Jul-2020 22:35
21-Jul-2020 22:24
22-Jul-2020 22:43
27-Jun-2020 22:50
28-Jun-2020 22:00
29-Jun-2020 22:11
30-Jun-2020 13:20
10-Jul-2020 12:40
05-Jul-2020 22:00

## 2020-07-22 NOTE — PROVIDER CONTACT NOTE (OTHER) - ACTION/TREATMENT ORDERED:
MD aware. No interventions at this time. Continue to monitor.
md notified,. md stated no intervention continue to monitor patient at this time
Md aware, no new interventions at this time
Continue to monitor
MD aware. No interventions at this time. Continue to monitor.
MD aware. No interventions at this time. Continue to monitor.
MD notified and aware. No interventions at this time.
MD notified and aware. VS rechecked. No interventions at this time
Md was informed. Bolus fluid to be ordered.
NNO. Continue to monitor patient.
continue to monitor
md notified,. md stated no intervention continue to monitor patient at this time
no treatment ordered

## 2020-07-22 NOTE — PROGRESS NOTE ADULT - PROBLEM SELECTOR PLAN 1
CT head showed no acute pathological changes. UDS is only positive for cannabinoids  Plan:  -HIV negative  -Attempting to find additional information re: history with social work. Police report filed  - Monitor for any acute mental status changes  - once a week labs

## 2020-07-22 NOTE — PROGRESS NOTE ADULT - ASSESSMENT
73 yo M who denies PMH, presenting [place] side [place] the [place] by EMS, found to have dementia and unable to provide a full history awaiting dispo plans by SW. Lacey visualized 7/14. Labs 7/14 stable. (Labs rosa every tuesday) 75 yo M who denies PMH, presenting [place] side [place] the [place] by EMS, found to have dementia and unable to provide a full history awaiting dispo plans by SW. Malae visualized 7/14. Labs 7/14 stable.

## 2020-07-23 PROCEDURE — 99232 SBSQ HOSP IP/OBS MODERATE 35: CPT | Mod: GC

## 2020-07-23 RX ORDER — CHOLECALCIFEROL (VITAMIN D3) 125 MCG
1000 CAPSULE ORAL DAILY
Refills: 0 | Status: DISCONTINUED | OUTPATIENT
Start: 2020-07-23 | End: 2020-07-23

## 2020-07-23 RX ADMIN — Medication 1 TABLET(S): at 11:11

## 2020-07-23 RX ADMIN — ENOXAPARIN SODIUM 30 MILLIGRAM(S): 100 INJECTION SUBCUTANEOUS at 11:10

## 2020-07-23 NOTE — PROGRESS NOTE ADULT - PROBLEM SELECTOR PLAN 2
Will evaluate specimen when placed at bedside  - lice again visualized 7/14, tx permethrin per derm. Remove all clothes he came in with and change bedding. Contact precautions.    -s/p 3x treatments of permethrin  - Lice is gone on examination

## 2020-07-23 NOTE — PROGRESS NOTE ADULT - SUBJECTIVE AND OBJECTIVE BOX
Sohail Velasquez  PGY1/Internal Medicine [id number]    JOHN BROWNLEE  74y  Male      Patient is a 74y old  Male who presents with a chief complaint of Dementia, unspecified type (22 Jul 2020 06:44)      INTERVAL HPI/OVERNIGHT EVENTS: No overnight events. No complaints. Pt appears slightly agitated. No lice seen.     REVIEW OF SYSTEMS:  REVIEW OF SYSTEMS:  CONSTITUTIONAL: No weakness, fevers or chills  EYES/ENT: No visual changes;  No vertigo or throat pain   NECK: No pain or stiffness  RESPIRATORY: No cough, wheezing, hemoptysis; No shortness of breath  CARDIOVASCULAR: No chest pain or palpitations  GASTROINTESTINAL: No abdominal or epigastric pain. No nausea, vomiting, or hematemesis; No diarrhea or constipation. No melena or hematochezia.  GENITOURINARY: No dysuria, frequency or hematuria  NEUROLOGICAL: No numbness or weakness  SKIN: No itching, rashes      Vital Signs Last 24 Hrs  T(C): 36.9 (22 Jul 2020 06:02), Max: 36.9 (21 Jul 2020 21:44)  T(F): 98.4 (22 Jul 2020 06:02), Max: 98.5 (21 Jul 2020 21:44)  HR: 57 (22 Jul 2020 06:02) (55 - 65)  BP: 118/61 (22 Jul 2020 06:02) (116/52 - 123/52)  BP(mean): --  RR: 16 (22 Jul 2020 06:02) (16 - 17)  SpO2: 100% (22 Jul 2020 06:02) (98% - 100%)    PHYSICAL EXAM:  GENERAL: NAD, well-groomed, well-developed  HEAD:  Atraumatic, Normocephalic  EYES: EOMI, PERRLA, conjunctiva and sclera clear  ENMT: Moist mucous membranes, Good dentition, No lesions  NECK: Supple, No JVD, Normal thyroid  NERVOUS SYSTEM:  Alert & Oriented X3, Good concentration; Motor Strength 5/5 B/L upper and lower extremities; DTRs 2+ intact and symmetric  CHEST/LUNG: Clear to auscultation bilaterally; No rales, rhonchi, wheezing, or rubs  HEART: Regular rate and rhythm; No murmurs, rubs, or gallops  ABDOMEN: Soft, Nontender, Nondistended; Bowel sounds present  EXTREMITIES:  2+ Peripheral Pulses, No clubbing, cyanosis, or edema  LYMPH: No lymphadenopathy noted  SKIN: No rashes or lesions; no lice seen     Consultant(s) Notes Reviewed:  [x ] YES  [ ] NO  Care Discussed with Consultants/Other Providers [ x] YES  [ ] NO    LABS:  Vitamin D, 25-Hydroxy (07.21.20 @ 07:05)    Vitamin D, 25-Hydroxy: 28.5: VITD Interpretive Data Result:  30.0-80.0 ng/mL Optimal levels (Reference Range)  >80.0 ng/mL Toxicity possible  20.0-29.0 ng/mL Insufficiency  10.0-19.0 ng/mL Mild to Moderate Deficiency  <10.0 ng/mL Severe Deficiency  Optimal levels for 25-Hydroxy vitamin D are 30.0 ng/mL and  above based upon the Endocrine Society guidelines 2011.  However, there is a lack of consensus on this and the  [place] of Medicine recommends 20.0 ng/mL and above as  optimal levels.  Vitamin D results may vary depending on  the method of analysis.  The Roche juan e801  electrochemiluminescent immunoassay method measures both  D2 and D3. ng/mL      Lipid Profile in AM (07.21.20 @ 07:05)    Cholesterol, Serum: 174 mg/dL    Triglycerides, Serum: 77 mg/dL    HDL Cholesterol, Serum: 68 mg/dL    Direct LDL: 100:   RESULT (mg/dL)   (For adults 18 years and older)  ----------------------------------------------------------  Below 70         Ideal for people at very high risk of  heart disease  Below 100        Ideal for people at risk of heart disease  100 - 129        [place]  130 - 159        Borderline high  160 - 189        High  190 and above    Very high mg/dL                    CAPILLARY BLOOD GLUCOSE          RADIOLOGY & ADDITIONAL TESTS:    Imaging Personally Reviewed:  [ ] YES  [ ] NO Sohail Velasquez  PGY1/Internal Medicine [id number]    JOHN BROWNLEE  74y  Male      Patient is a 74y old  Male who presents with a chief complaint of Dementia, unspecified type (22 Jul 2020 06:44)      INTERVAL HPI/OVERNIGHT EVENTS: No overnight events. No complaints. No lice seen.     REVIEW OF SYSTEMS:  CONSTITUTIONAL: No weakness, fevers or chills  EYES/ENT: No visual changes;  No vertigo or throat pain   NECK: No pain or stiffness  RESPIRATORY: No cough, wheezing, hemoptysis; No shortness of breath  CARDIOVASCULAR: No chest pain or palpitations  GASTROINTESTINAL: No abdominal or epigastric pain. No nausea, vomiting, or hematemesis; No diarrhea or constipation. No melena or hematochezia.  GENITOURINARY: No dysuria, frequency or hematuria  NEUROLOGICAL: No numbness or weakness  SKIN: No itching, rashes      Vital Signs Last 24 Hrs  T(C): 36.9 (22 Jul 2020 06:02), Max: 36.9 (21 Jul 2020 21:44)  T(F): 98.4 (22 Jul 2020 06:02), Max: 98.5 (21 Jul 2020 21:44)  HR: 57 (22 Jul 2020 06:02) (55 - 65)  BP: 118/61 (22 Jul 2020 06:02) (116/52 - 123/52)  BP(mean): --  RR: 16 (22 Jul 2020 06:02) (16 - 17)  SpO2: 100% (22 Jul 2020 06:02) (98% - 100%)    PHYSICAL EXAM:  GENERAL: NAD, well-groomed, well-developed  HEAD:  Atraumatic, Normocephalic  EYES: EOMI, PERRLA, conjunctiva and sclera clear  ENMT: Moist mucous membranes, Good dentition, No lesions  NECK: Supple, No JVD, Normal thyroid  NERVOUS SYSTEM:  Alert & Oriented X3, Good concentration; Motor Strength 5/5 B/L upper and lower extremities; DTRs 2+ intact and symmetric  CHEST/LUNG: Clear to auscultation bilaterally; No rales, rhonchi, wheezing, or rubs  HEART: Regular rate and rhythm; No murmurs, rubs, or gallops  ABDOMEN: Soft, Nontender, Nondistended; Bowel sounds present  EXTREMITIES:  2+ Peripheral Pulses, No clubbing, cyanosis, or edema  LYMPH: No lymphadenopathy noted  SKIN: No rashes or lesions; no lice seen     Consultant(s) Notes Reviewed:  [x ] YES  [ ] NO  Care Discussed with Consultants/Other Providers [ x] YES  [ ] NO    LABS:  Vitamin D, 25-Hydroxy (07.21.20 @ 07:05)    Vitamin D, 25-Hydroxy: 28.5: VITD Interpretive Data Result:  30.0-80.0 ng/mL Optimal levels (Reference Range)  >80.0 ng/mL Toxicity possible  20.0-29.0 ng/mL Insufficiency  10.0-19.0 ng/mL Mild to Moderate Deficiency  <10.0 ng/mL Severe Deficiency  Optimal levels for 25-Hydroxy vitamin D are 30.0 ng/mL and  above based upon the Endocrine Society guidelines 2011.  However, there is a lack of consensus on this and the  [place] of Medicine recommends 20.0 ng/mL and above as  optimal levels.  Vitamin D results may vary depending on  the method of analysis.  The Roche juan e801  electrochemiluminescent immunoassay method measures both  D2 and D3. ng/mL      Lipid Profile in AM (07.21.20 @ 07:05)    Cholesterol, Serum: 174 mg/dL    Triglycerides, Serum: 77 mg/dL    HDL Cholesterol, Serum: 68 mg/dL    Direct LDL: 100:   RESULT (mg/dL)   (For adults 18 years and older)  ----------------------------------------------------------  Below 70         Ideal for people at very high risk of  heart disease  Below 100        Ideal for people at risk of heart disease  100 - 129        [place]  130 - 159        Borderline high  160 - 189        High  190 and above    Very high mg/dL                    CAPILLARY BLOOD GLUCOSE          RADIOLOGY & ADDITIONAL TESTS:    Imaging Personally Reviewed:  [ ] YES  [ ] NO

## 2020-07-23 NOTE — PROGRESS NOTE ADULT - PROBLEM SELECTOR PLAN 4
Patient appears cachectic. Unable to provide history about recent weight changes. Chest xray unremarkable. CMP unremarkable.  Plan:  -Hep B negative  -Hep C negative  -HIV negative  - lipid panel normal and vit. D slightly low Patient appears cachectic. Unable to provide history about recent weight changes. Chest xray unremarkable. CMP unremarkable.  Plan:  -Hep B negative  -Hep C negative  -HIV negative  - lipid panel normal and vit. D slightly low (1000D3 qd) Patient appears cachectic. Unable to provide history about recent weight changes. Chest xray unremarkable. CMP unremarkable.  Plan:  -Hep B negative  -Hep C negative  -HIV negative  - lipid panel normal and vit. D slightly low (started on multivitamin)

## 2020-07-24 ENCOUNTER — TRANSCRIPTION ENCOUNTER (OUTPATIENT)
Age: 75
End: 2020-07-24

## 2020-07-24 VITALS
SYSTOLIC BLOOD PRESSURE: 120 MMHG | OXYGEN SATURATION: 100 % | HEART RATE: 60 BPM | DIASTOLIC BLOOD PRESSURE: 63 MMHG | TEMPERATURE: 98 F | RESPIRATION RATE: 17 BRPM

## 2020-07-24 PROCEDURE — 99239 HOSP IP/OBS DSCHRG MGMT >30: CPT

## 2020-07-24 RX ORDER — HALOPERIDOL DECANOATE 100 MG/ML
1 INJECTION INTRAMUSCULAR ONCE
Refills: 0 | Status: DISCONTINUED | OUTPATIENT
Start: 2020-07-24 | End: 2020-07-24

## 2020-07-24 RX ORDER — LANOLIN ALCOHOL/MO/W.PET/CERES
2 CREAM (GRAM) TOPICAL
Qty: 0 | Refills: 0 | DISCHARGE
Start: 2020-07-24

## 2020-07-24 RX ADMIN — Medication 1 TABLET(S): at 11:08

## 2020-07-24 RX ADMIN — ENOXAPARIN SODIUM 30 MILLIGRAM(S): 100 INJECTION SUBCUTANEOUS at 11:08

## 2020-07-24 NOTE — PROGRESS NOTE ADULT - PROBLEM SELECTOR PLAN 1
CT head showed no acute pathological changes. UDS is only positive for cannabinoids  Plan:  -HIV negative  -Attempting to find additional information re: history with social work. Police report filed  - Monitor for any acute mental status changes  - no labs

## 2020-07-24 NOTE — PROGRESS NOTE ADULT - PROBLEM SELECTOR PLAN 2
Will evaluate specimen when placed at bedside  - lice again visualized 7/14, tx permethrin per derm. Remove all clothes he came in with and change bedding. Contact precautions.    - s/p 3x treatments of permethrin  - Lice is gone on examination

## 2020-07-24 NOTE — DISCHARGE NOTE NURSING/CASE MANAGEMENT/SOCIAL WORK - NSDCCRNAME_GEN_ALL_CORE_FT
[facility] [address] [address]; [facility] all other ROS negative except as per HPI

## 2020-07-24 NOTE — PROGRESS NOTE ADULT - PROBLEM SELECTOR PLAN 4
Patient appears cachectic. Unable to provide history about recent weight changes. Chest xray unremarkable. CMP unremarkable.  Plan:  -Hep B negative  -Hep C negative  -HIV negative  - lipid panel normal and vit. D slightly low (started on multivitamin) Patient appears cachectic. Unable to provide history about recent weight changes. Chest xray unremarkable. CMP unremarkable.  Plan:  -Hep B negative  -Hep C negative  -HIV negative  - lipid panel normal and vit. D slightly low (started on multivitamin), encouraged to increase egg/milk in diet

## 2020-07-24 NOTE — PROGRESS NOTE ADULT - REASON FOR ADMISSION
Dementia, unspecified type

## 2020-07-24 NOTE — PROGRESS NOTE ADULT - PROBLEM SELECTOR PROBLEM 1
Dementia without behavioral disturbance, unspecified dementia type

## 2020-07-24 NOTE — PROGRESS NOTE ADULT - SUBJECTIVE AND OBJECTIVE BOX
Sohail Velasquez  PGY1/Internal Medicine [id number]    JOHN BROWNLEE  74y  Male      Patient is a 74y old  Male who presents with a chief complaint of Dementia, unspecified type (24 Jul 2020 06:44)      INTERVAL HPI/OVERNIGHT EVENTS: No overnight events. No complaints. No lice seen.     REVIEW OF SYSTEMS:  CONSTITUTIONAL: No weakness, fevers or chills  EYES/ENT: No visual changes;  No vertigo or throat pain   NECK: No pain or stiffness  RESPIRATORY: No cough, wheezing, hemoptysis; No shortness of breath  CARDIOVASCULAR: No chest pain or palpitations  GASTROINTESTINAL: No abdominal or epigastric pain. No nausea, vomiting, or hematemesis; No diarrhea or constipation. No melena or hematochezia.  GENITOURINARY: No dysuria, frequency or hematuria  NEUROLOGICAL: No numbness or weakness  SKIN: No itching, rashes    Vital Signs Last 24 Hrs  T(C): 36.7 (23 Jul 2020 12:48), Max: 36.7 (23 Jul 2020 12:48)  T(F): 98 (23 Jul 2020 12:48), Max: 98 (23 Jul 2020 12:48)  HR: 57 (23 Jul 2020 12:48) (57 - 57)  BP: 115/57 (23 Jul 2020 12:48) (115/57 - 115/57)  BP(mean): --  RR: 18 (23 Jul 2020 12:48) (18 - 18)  SpO2: 100% (23 Jul 2020 12:48) (100% - 100%)    PHYSICAL EXAM:  GENERAL: NAD, well-groomed, cachectic  HEAD:  Atraumatic, Normocephalic  EYES: EOMI, PERRLA, conjunctiva and sclera clear  ENMT: Moist mucous membranes, Good dentition, No lesions  NECK: Supple, No JVD, Normal thyroid  NERVOUS SYSTEM:  Alert & Oriented X3, Good concentration; Motor Strength 5/5 B/L upper and lower extremities; DTRs 2+ intact and symmetric  CHEST/LUNG: Clear to auscultation bilaterally; No rales, rhonchi, wheezing, or rubs  HEART: Regular rate and rhythm; No murmurs, rubs, or gallops  ABDOMEN: Soft, Nontender, Nondistended; Bowel sounds present  EXTREMITIES:  2+ Peripheral Pulses, No clubbing, cyanosis, or edema  LYMPH: No lymphadenopathy noted  SKIN: No rashes or lesions; no lice seen     Consultant(s) Notes Reviewed:  [x ] YES  [ ] NO  Care Discussed with Consultants/Other Providers [ x] YES  [ ] NO    LABS:  Vitamin D, 25-Hydroxy (07.21.20 @ 07:05)    Vitamin D, 25-Hydroxy: 28.5: VITD Interpretive Data Result:  30.0-80.0 ng/mL Optimal levels (Reference Range)  >80.0 ng/mL Toxicity possible  20.0-29.0 ng/mL Insufficiency  10.0-19.0 ng/mL Mild to Moderate Deficiency  <10.0 ng/mL Severe Deficiency  Optimal levels for 25-Hydroxy vitamin D are 30.0 ng/mL and  above based upon the Endocrine Society guidelines 2011.  However, there is a lack of consensus on this and the  [place] of Medicine recommends 20.0 ng/mL and above as  optimal levels.  Vitamin D results may vary depending on  the method of analysis.  The Roche juan e801  electrochemiluminescent immunoassay method measures both  D2 and D3. ng/mL      Lipid Profile in AM (07.21.20 @ 07:05)    Cholesterol, Serum: 174 mg/dL    Triglycerides, Serum: 77 mg/dL    HDL Cholesterol, Serum: 68 mg/dL    Direct LDL: 100:   RESULT (mg/dL)   (For adults 18 years and older)  ----------------------------------------------------------  Below 70         Ideal for people at very high risk of  heart disease  Below 100        Ideal for people at risk of heart disease  100 - 129        [place]  130 - 159        Borderline high  160 - 189        High  190 and above    Very high mg/dL                    CAPILLARY BLOOD GLUCOSE          RADIOLOGY & ADDITIONAL TESTS:    Imaging Personally Reviewed:  [ ] YES  [ ] NO Sohail Velasquez  PGY1/Internal Medicine [id number]    JOHN BROWNLEE  74y  Male      Patient is a 74y old  Male who presents with a chief complaint of Dementia, unspecified type (24 Jul 2020 06:44)      INTERVAL HPI/OVERNIGHT EVENTS: No overnight events. No complaints. No lice seen. He says his family can be notified in consulate of [place] and that he is a millionaire.     REVIEW OF SYSTEMS:  CONSTITUTIONAL: No weakness, fevers or chills  EYES/ENT: No visual changes;  No vertigo or throat pain   NECK: No pain or stiffness  RESPIRATORY: No cough, wheezing, hemoptysis; No shortness of breath  CARDIOVASCULAR: No chest pain or palpitations  GASTROINTESTINAL: No abdominal or epigastric pain. No nausea, vomiting, or hematemesis; No diarrhea or constipation. No melena or hematochezia.  GENITOURINARY: No dysuria, frequency or hematuria  NEUROLOGICAL: No numbness or weakness  SKIN: No itching, rashes    Vital Signs Last 24 Hrs  T(C): 36.7 (23 Jul 2020 12:48), Max: 36.7 (23 Jul 2020 12:48)  T(F): 98 (23 Jul 2020 12:48), Max: 98 (23 Jul 2020 12:48)  HR: 57 (23 Jul 2020 12:48) (57 - 57)  BP: 115/57 (23 Jul 2020 12:48) (115/57 - 115/57)  BP(mean): --  RR: 18 (23 Jul 2020 12:48) (18 - 18)  SpO2: 100% (23 Jul 2020 12:48) (100% - 100%)    PHYSICAL EXAM:  GENERAL: NAD, well-groomed, cachectic  HEAD:  Atraumatic, Normocephalic  EYES: EOMI, PERRLA, conjunctiva and sclera clear  ENMT: Moist mucous membranes, Good dentition, No lesions  NECK: Supple, No JVD, Normal thyroid  NERVOUS SYSTEM:  Alert & Oriented X3, Good concentration; Motor Strength 5/5 B/L upper and lower extremities; DTRs 2+ intact and symmetric  CHEST/LUNG: Clear to auscultation bilaterally; No rales, rhonchi, wheezing, or rubs  HEART: Regular rate and rhythm; No murmurs, rubs, or gallops  ABDOMEN: Soft, Nontender, Nondistended; Bowel sounds present  EXTREMITIES:  2+ Peripheral Pulses, No clubbing, cyanosis, or edema  LYMPH: No lymphadenopathy noted  SKIN: No rashes or lesions; no lice seen     Consultant(s) Notes Reviewed:  [x ] YES  [ ] NO  Care Discussed with Consultants/Other Providers [ x] YES  [ ] NO    LABS:  Vitamin D, 25-Hydroxy (07.21.20 @ 07:05)    Vitamin D, 25-Hydroxy: 28.5: VITD Interpretive Data Result:  30.0-80.0 ng/mL Optimal levels (Reference Range)  >80.0 ng/mL Toxicity possible  20.0-29.0 ng/mL Insufficiency  10.0-19.0 ng/mL Mild to Moderate Deficiency  <10.0 ng/mL Severe Deficiency  Optimal levels for 25-Hydroxy vitamin D are 30.0 ng/mL and  above based upon the Endocrine Society guidelines 2011.  However, there is a lack of consensus on this and the  [place] of Medicine recommends 20.0 ng/mL and above as  optimal levels.  Vitamin D results may vary depending on  the method of analysis.  The Roche juan e801  electrochemiluminescent immunoassay method measures both  D2 and D3. ng/mL      Lipid Profile in AM (07.21.20 @ 07:05)    Cholesterol, Serum: 174 mg/dL    Triglycerides, Serum: 77 mg/dL    HDL Cholesterol, Serum: 68 mg/dL    Direct LDL: 100:   RESULT (mg/dL)   (For adults 18 years and older)  ----------------------------------------------------------  Below 70         Ideal for people at very high risk of  heart disease  Below 100        Ideal for people at risk of heart disease  100 - 129        [place]  130 - 159        Borderline high  160 - 189        High  190 and above    Very high mg/dL                    CAPILLARY BLOOD GLUCOSE          RADIOLOGY & ADDITIONAL TESTS:    Imaging Personally Reviewed:  [ ] YES  [ ] NO

## 2020-07-24 NOTE — PROGRESS NOTE ADULT - ATTENDING COMMENTS
Patient seen and examined, care d/w residents    Today still knows self and telling me is in a clinic, reports he is from [place]" and I should call the consulate. When asked how he got to [place] states he doesn't know, he got lost.     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  Appears nontoxic and nondelirous, ate all his food.  Calm no reported agitation.  Labs unremarkable, TSH/B12/folic acid wnl.  Possible lice seen by RN s/p permethrin tx. PT no needs.  Severe protein calorie malnutrition possibly lack of access to food as eating everything on tray here.  - SW to assist with identifying where pt lives etc
Patient seen and examined. Agree with above note by resident.    Seen and examined. No concerns. Feels well. Eating well. No behavorial issues, patient is calm. Getting permethrin for lice. SW evaluation pending for safe dispo.
Patient seen and examined. Agree with above note by resident.    Seen and examined. No concerns. Feels well. Eating well. No behavorial issues, patient is calm. S/p permethrin for lice. SW evaluation pending for safe dispo.
Patient seen and examined. Agree with above note by resident.    Seen and examined. No concerns. Feels well. Eating well. No behavorial issues, patient is calm. S/p permethrin for lice. SW evaluation pending for safe dispo.
I personally saw and examined the patient.  Discussed with the resident physician and agree with the resident's findings and plan as documented above. Await safe d/c plan.
Patient seen and examined. Agree with above note by resident.    Seen and examined. No concerns. Feels well. Eating well. No behavorial issues, patient is calm. S/p permethrin for lice. SW evaluation pending for safe dispo.
Repeat permethrin treatment per Dermatology. F/u SW to arrange safe discharge.
awaiting ID - pending placement.
awaiting dispo.
awaiting fingerprinting for ID and then placement.
discharge planning and coordination ~ 45mins.
patient seen and examine at bed side independently  agree with above A/P    DC planning  f/u SW
pending placement.
I personally saw and examined the patient.  Discussed with the resident physician and agree with the resident's findings and plan as documented above. Await safe dispo.
No acute medical issues. Pt remains a poor historian but alert and interactive. F/u SW for safe disposition.
patient seen and examine at bed side independently  agree with above A/P    DC planning  f/u SW.
pending police identification.  discharge planning to facility as per MARCIA.
permethrin again on 7/14 due to louse found on bedding.  awaiting police for fingerprinting and ID.
Comfortable without complaint, afebrile with stable vital signs, no concerning exam features. Plan discharge to SNF today. Time planning discharge 35 minutes.
Pt eating well and has no new complaint. F/u complex care SW.
patient seen and examine at bed side independently  agree with above A/P    DC planning  f/u SW
waiting for safe dispo.
Patient seen and examined, care d/w residents    73 yo M unclear medical history, old CVA per [facility] found wandering---pt knows name, June 2020, but unable to report where he lives.  Appears nontoxic and nondelirous, ate all his food.  Calm no reported agitation.  Labs unremarkable, TSH/B12/folic acid wnl.  Possible lice seen by RN for treatment.  PT no needs.  Severe protein calorie malnutrition possibly lack of access to food as eating everything on tray here.  - SW to assist with identifying where pt lives etc
Head lice appears resolved. Will confirm today if isolation precautions can be d/c'd. F/u SW for safe discharge.
will check labs in am.  d/c planning.

## 2020-07-24 NOTE — PROGRESS NOTE ADULT - PROVIDER SPECIALTY LIST ADULT
Internal Medicine

## 2020-07-24 NOTE — DISCHARGE NOTE NURSING/CASE MANAGEMENT/SOCIAL WORK - PATIENT PORTAL LINK FT
You can access the FollowMyHealth Patient Portal offered by [facility] by registering at the following website: [email or web address]. By joining [facility]’s FollowMyHealth portal, you will also be able to view your health information using other applications (apps) compatible with our system.

## 2022-08-05 NOTE — PROGRESS NOTE ADULT - PROBLEM SELECTOR PLAN 1
CT head showed no acute pathological changes. UDS is only positive for cannabinoids  Plan:  -HIV negative  -Attempting to find additional information re: history with social work. Police report filed  -Monitor for any acute mental status changes suicidal thoughts

## 2023-07-31 NOTE — PROGRESS NOTE ADULT - PROBLEM SELECTOR PLAN 2
Will evaluate specimen when placed at bedside  - lice again visualized 7/14, tx permethrin per derm. Remove all clothes he came in with and change bedding. Contact precautions.    -Derm consult if new insects found Ivermectin Counseling:  Patient instructed to take medication on an empty stomach with a full glass of water.  Patient informed of potential adverse effects including but not limited to nausea, diarrhea, dizziness, itching, and swelling of the extremities or lymph nodes.  The patient verbalized understanding of the proper use and possible adverse effects of ivermectin.  All of the patient's questions and concerns were addressed.

## 2023-08-07 NOTE — ED ADULT NURSE NOTE - CAS TRG GEN SKIN CONDITION
Warm Silver Nitrate Text: The wound bed was treated with silver nitrate after the biopsy was performed.

## 2023-10-06 NOTE — PROVIDER CONTACT NOTE (OTHER) - RECOMMENDATIONS
notify md, continue to monitor pt
Notify MD
Dr. Chinchilla notified
Dr. Chinchilla notified
Dr. Valiente notified
Dr. Chinchilla notified
Notify MD
Notify MD.
Notify MD. Gurmeet ANDUJAR.
Notify Md
Per md.
continue to assess
notify md, continue to monitor pt
Xolair Pregnancy And Lactation Text: This medication is Pregnancy Category B and is considered safe during pregnancy. This medication is excreted in breast milk.

## 2024-01-04 PROBLEM — Z78.9 OTHER SPECIFIED HEALTH STATUS: Chronic | Status: ACTIVE | Noted: 2020-06-26

## 2024-04-28 PROBLEM — Z00.00 ENCOUNTER FOR PREVENTIVE HEALTH EXAMINATION: Status: ACTIVE | Noted: 2024-04-28

## 2024-05-08 ENCOUNTER — APPOINTMENT (OUTPATIENT)
Dept: GASTROENTEROLOGY | Facility: CLINIC | Age: 79
End: 2024-05-08